# Patient Record
Sex: MALE | Race: BLACK OR AFRICAN AMERICAN | NOT HISPANIC OR LATINO | Employment: FULL TIME | URBAN - METROPOLITAN AREA
[De-identification: names, ages, dates, MRNs, and addresses within clinical notes are randomized per-mention and may not be internally consistent; named-entity substitution may affect disease eponyms.]

---

## 2021-05-24 ENCOUNTER — OFFICE VISIT (OUTPATIENT)
Dept: INTERNAL MEDICINE | Facility: CLINIC | Age: 51
End: 2021-05-24

## 2021-05-24 VITALS
DIASTOLIC BLOOD PRESSURE: 96 MMHG | WEIGHT: 155 LBS | HEIGHT: 64 IN | OXYGEN SATURATION: 98 % | RESPIRATION RATE: 18 BRPM | SYSTOLIC BLOOD PRESSURE: 132 MMHG | BODY MASS INDEX: 26.46 KG/M2 | TEMPERATURE: 97 F | HEART RATE: 74 BPM

## 2021-05-24 DIAGNOSIS — R94.31 ABNORMAL EKG: ICD-10-CM

## 2021-05-24 DIAGNOSIS — I10 ESSENTIAL HYPERTENSION: ICD-10-CM

## 2021-05-24 DIAGNOSIS — J30.1 NON-SEASONAL ALLERGIC RHINITIS DUE TO POLLEN: ICD-10-CM

## 2021-05-24 DIAGNOSIS — R07.89 ATYPICAL CHEST PAIN: Primary | ICD-10-CM

## 2021-05-24 LAB
ALBUMIN SERPL-MCNC: 4 G/DL (ref 3.5–5.2)
ALBUMIN/GLOB SERPL: 1.3 G/DL
ALP SERPL-CCNC: 85 U/L (ref 39–117)
ALT SERPL W P-5'-P-CCNC: 22 U/L (ref 1–41)
ANION GAP SERPL CALCULATED.3IONS-SCNC: 8 MMOL/L (ref 5–15)
AST SERPL-CCNC: 20 U/L (ref 1–40)
BASOPHILS # BLD AUTO: 0.01 10*3/MM3 (ref 0–0.2)
BASOPHILS NFR BLD AUTO: 0.3 % (ref 0–1.5)
BILIRUB SERPL-MCNC: 0.4 MG/DL (ref 0–1.2)
BUN SERPL-MCNC: 9 MG/DL (ref 6–20)
BUN/CREAT SERPL: 8.4 (ref 7–25)
CALCIUM SPEC-SCNC: 9.1 MG/DL (ref 8.6–10.5)
CHLORIDE SERPL-SCNC: 101 MMOL/L (ref 98–107)
CO2 SERPL-SCNC: 29 MMOL/L (ref 22–29)
CREAT SERPL-MCNC: 1.07 MG/DL (ref 0.76–1.27)
DEPRECATED RDW RBC AUTO: 40.8 FL (ref 37–54)
EOSINOPHIL # BLD AUTO: 0.04 10*3/MM3 (ref 0–0.4)
EOSINOPHIL NFR BLD AUTO: 1.2 % (ref 0.3–6.2)
ERYTHROCYTE [DISTWIDTH] IN BLOOD BY AUTOMATED COUNT: 12.2 % (ref 12.3–15.4)
GFR SERPL CREATININE-BSD FRML MDRD: 74 ML/MIN/1.73
GFR SERPL CREATININE-BSD FRML MDRD: 89 ML/MIN/1.73
GLOBULIN UR ELPH-MCNC: 3.2 GM/DL
GLUCOSE SERPL-MCNC: 78 MG/DL (ref 65–99)
HCT VFR BLD AUTO: 45.2 % (ref 37.5–51)
HGB BLD-MCNC: 15 G/DL (ref 13–17.7)
IMM GRANULOCYTES # BLD AUTO: 0.02 10*3/MM3 (ref 0–0.05)
IMM GRANULOCYTES NFR BLD AUTO: 0.6 % (ref 0–0.5)
LYMPHOCYTES # BLD AUTO: 1.93 10*3/MM3 (ref 0.7–3.1)
LYMPHOCYTES NFR BLD AUTO: 55.9 % (ref 19.6–45.3)
MCH RBC QN AUTO: 30.4 PG (ref 26.6–33)
MCHC RBC AUTO-ENTMCNC: 33.2 G/DL (ref 31.5–35.7)
MCV RBC AUTO: 91.5 FL (ref 79–97)
MONOCYTES # BLD AUTO: 0.39 10*3/MM3 (ref 0.1–0.9)
MONOCYTES NFR BLD AUTO: 11.3 % (ref 5–12)
NEUTROPHILS NFR BLD AUTO: 1.06 10*3/MM3 (ref 1.7–7)
NEUTROPHILS NFR BLD AUTO: 30.7 % (ref 42.7–76)
NRBC BLD AUTO-RTO: 0 /100 WBC (ref 0–0.2)
PLAT MORPH BLD: NORMAL
PLATELET # BLD AUTO: 187 10*3/MM3 (ref 140–450)
PMV BLD AUTO: 11.1 FL (ref 6–12)
POTASSIUM SERPL-SCNC: 3.7 MMOL/L (ref 3.5–5.2)
PROT SERPL-MCNC: 7.2 G/DL (ref 6–8.5)
RBC # BLD AUTO: 4.94 10*6/MM3 (ref 4.14–5.8)
RBC MORPH BLD: NORMAL
SODIUM SERPL-SCNC: 138 MMOL/L (ref 136–145)
TROPONIN T SERPL-MCNC: <0.01 NG/ML (ref 0–0.03)
WBC # BLD AUTO: 3.45 10*3/MM3 (ref 3.4–10.8)
WBC MORPH BLD: NORMAL

## 2021-05-24 PROCEDURE — 93000 ELECTROCARDIOGRAM COMPLETE: CPT | Performed by: NURSE PRACTITIONER

## 2021-05-24 PROCEDURE — 99204 OFFICE O/P NEW MOD 45 MIN: CPT | Performed by: NURSE PRACTITIONER

## 2021-05-24 PROCEDURE — 36415 COLL VENOUS BLD VENIPUNCTURE: CPT | Performed by: NURSE PRACTITIONER

## 2021-05-24 PROCEDURE — 85007 BL SMEAR W/DIFF WBC COUNT: CPT | Performed by: NURSE PRACTITIONER

## 2021-05-24 PROCEDURE — 85025 COMPLETE CBC W/AUTO DIFF WBC: CPT | Performed by: NURSE PRACTITIONER

## 2021-05-24 PROCEDURE — 80053 COMPREHEN METABOLIC PANEL: CPT | Performed by: NURSE PRACTITIONER

## 2021-05-24 PROCEDURE — 84484 ASSAY OF TROPONIN QUANT: CPT | Performed by: NURSE PRACTITIONER

## 2021-05-24 RX ORDER — CETIRIZINE HYDROCHLORIDE 10 MG/1
10 TABLET ORAL DAILY
Qty: 30 TABLET | Refills: 5 | Status: SHIPPED | OUTPATIENT
Start: 2021-05-24 | End: 2021-06-14

## 2021-05-24 RX ORDER — AMLODIPINE BESYLATE 10 MG/1
10 TABLET ORAL DAILY
Qty: 30 TABLET | Refills: 5 | Status: SHIPPED | OUTPATIENT
Start: 2021-05-24 | End: 2022-01-13

## 2021-05-24 RX ORDER — AMLODIPINE BESYLATE 5 MG/1
5 TABLET ORAL DAILY
COMMUNITY
Start: 2021-04-12 | End: 2021-05-24 | Stop reason: DRUGHIGH

## 2021-05-24 NOTE — PATIENT INSTRUCTIONS
Nonspecific Chest Pain, Adult  Chest pain can be caused by many different conditions. It can be caused by a condition that is life-threatening and requires treatment right away. It can also be caused by something that is not life-threatening. If you have chest pain, it can be hard to know the difference, so it is important to get help right away to make sure that you do not have a serious condition.  Some life-threatening causes of chest pain include:  · Heart attack.  · A tear in the body's main blood vessel (aortic dissection).  · Inflammation around your heart (pericarditis).  · A problem in the lungs, such as a blood clot (pulmonary embolism) or a collapsed lung (pneumothorax).  Some non life-threatening causes of chest pain include:  · Heartburn.  · Anxiety or stress.  · Damage to the bones, muscles, and cartilage that make up your chest wall.  · Pneumonia or bronchitis.  · Shingles infection (varicella-zoster virus).  Chest pain can feel like:  · Pain or discomfort on the surface of your chest or deep in your chest.  · Crushing, pressure, aching, or squeezing pain.  · Burning or tingling.  · Dull or sharp pain that is worse when you move, cough, or take a deep breath.  · Pain or discomfort that is also felt in your back, neck, jaw, shoulder, or arm, or pain that spreads to any of these areas.  Your chest pain may come and go. It may also be constant. Your health care provider will do lab tests and other studies to find the cause of your pain. Treatment will depend on the cause of your chest pain.  Follow these instructions at home:  Medicines  · Take over-the-counter and prescription medicines only as told by your health care provider.  · If you were prescribed an antibiotic, take it as told by your health care provider. Do not stop taking the antibiotic even if you start to feel better.  Lifestyle    · Rest as directed by your health care provider.  · Do not use any products that contain nicotine or tobacco,  such as cigarettes and e-cigarettes. If you need help quitting, ask your health care provider.  · Do not drink alcohol.  · Make healthy lifestyle choices as recommended. These may include:  ? Getting regular exercise. Ask your health care provider to suggest some activities that are safe for you.  ? Eating a heart-healthy diet. This includes plenty of fresh fruits and vegetables, whole grains, low-fat (lean) protein, and low-fat dairy products. A dietitian can help you find healthy eating options.  ? Maintaining a healthy weight.  ? Managing any other health conditions you have, such as high blood pressure (hypertension) or diabetes.  ? Reducing stress, such as with yoga or relaxation techniques.  General instructions  · Pay attention to any changes in your symptoms. Tell your health care provider about them or any new symptoms.  · Avoid any activities that cause chest pain.  · Keep all follow-up visits as told by your health care provider. This is important. This includes visits for any further testing if your chest pain does not go away.  Contact a health care provider if:  · Your chest pain does not go away.  · You feel depressed.  · You have a fever.  Get help right away if:  · Your chest pain gets worse.  · You have a cough that gets worse, or you cough up blood.  · You have severe pain in your abdomen.  · You faint.  · You have sudden, unexplained chest discomfort.  · You have sudden, unexplained discomfort in your arms, back, neck, or jaw.  · You have shortness of breath at any time.  · You suddenly start to sweat, or your skin gets clammy.  · You feel nausea or you vomit.  · You suddenly feel lightheaded or dizzy.  · You have severe weakness, or unexplained weakness or fatigue.  · Your heart begins to beat quickly, or it feels like it is skipping beats.  These symptoms may represent a serious problem that is an emergency. Do not wait to see if the symptoms will go away. Get medical help right away. Call your  local emergency services (911 in the [place] Do not drive yourself to the hospital.  Summary  · Chest pain can be caused by a condition that is serious and requires urgent treatment. It may also be caused by something that is not life-threatening.  · If you have chest pain, it is very important to see your health care provider. Your health care provider may do lab tests and other studies to find the cause of your pain.  · Follow your health care provider's instructions on taking medicines, making lifestyle changes, and getting emergency treatment if symptoms become worse.  · Keep all follow-up visits as told by your health care provider. This includes visits for any further testing if your chest pain does not go away.  This information is not intended to replace advice given to you by your health care provider. Make sure you discuss any questions you have with your health care provider.  Document Revised: 06/20/2019 Document Reviewed: 06/20/2019  M[employer] Interactive Patient Education © 2021 [employer]

## 2021-05-24 NOTE — PROGRESS NOTES
"Subjective   Chief Complaint   Patient presents with   • Headache     x1 week, more in the morning    • Nasal Congestion     allergies x1 year    • Dizziness   • Chest Pain     dull pain x1 week       Gerardo Mason is a 50 y.o. male here today as a new patient to establish care.  Pt recently moved from [place].  Pt complains of intermittent chest pain.  This started about a week ago.  The pain generally occurs at night.  He gets SOB with it but no nausea or diaphoresis.  The CP usually lasts a few seconds.  He has never had CP in the past.  He has a heart murmur and had an Echo about 3 years ago which was normal.  Pt does not smoke or drink any alcohol.  He denies any CP at this time.  When the pain occurs he has bilateral neck tightness.  He denies arm or back pain.  He denies any stress or feelings of anxiety.  Pt is a .  His last episode of CP was last night, \"had one episode\".  Pt states that he was having severe headaches in the morning but he hasn't had any since Saturday.    Pt complains of bloody mucous for about a year from his nose.  His previous PCP thought this was allergies so he was started on Claritin.  His allergies has worsened since he moved to [place].  His ears do not hurt but they do itch.  He denies sore throat.     Review of Systems   Constitutional: Negative for activity change, appetite change and fatigue.   HENT: Positive for congestion, postnasal drip and rhinorrhea. Negative for ear discharge, ear pain and sinus pressure.    Respiratory: Negative for cough, shortness of breath and wheezing.    Cardiovascular: Negative for chest pain and leg swelling.   Gastrointestinal: Negative for abdominal pain.   Neurological: Positive for dizziness and headache. Negative for weakness and confusion.   Psychiatric/Behavioral: Negative for behavioral problems and decreased concentration.       Past Medical History:   Diagnosis Date   • Colon polyp    • GERD (gastroesophageal reflux disease)    • Heart " "murmur    • Peptic ulceration      Past Surgical History:   Procedure Laterality Date   • PTERYGIUM EXCISION       Family History   Problem Relation Age of Onset   • Alzheimer's disease Mother      Social History     Tobacco Use   Smoking Status Never Smoker   Smokeless Tobacco Never Used      Social History     Substance and Sexual Activity   Alcohol Use Never      Current Outpatient Medications on File Prior to Visit   Medication Sig   • Oxymetazoline HCl (NASAL SPRAY 12 HOUR NA) into the nostril(s) as directed by provider.   • [DISCONTINUED] amLODIPine (NORVASC) 5 MG tablet Take 5 mg by mouth Daily.     No current facility-administered medications on file prior to visit.     Allergies   Allergen Reactions   • Penicillins Unknown - Low Severity       Objective   Vitals:    05/24/21 1114   BP: 132/96   Pulse: 74   Resp: 18   Temp: 97 °F (36.1 °C)   TempSrc: Temporal   SpO2: 98%   Weight: 70.3 kg (155 lb)   Height: 162.6 cm (64\")     Body mass index is 26.61 kg/m².    Physical Exam  Vitals and nursing note reviewed.   HENT:      Head: Normocephalic.      Comments: L TM dull with fluid behind it     Right Ear: Tympanic membrane, ear canal and external ear normal.      Left Ear: Ear canal normal.   Eyes:      Pupils: Pupils are equal, round, and reactive to light.   Cardiovascular:      Rate and Rhythm: Normal rate and regular rhythm.      Pulses: Normal pulses.      Heart sounds: Normal heart sounds.   Pulmonary:      Effort: Pulmonary effort is normal.      Breath sounds: Normal breath sounds.   Musculoskeletal:      Cervical back: Normal range of motion.   Skin:     General: Skin is warm and dry.      Capillary Refill: Capillary refill takes less than 2 seconds.   Neurological:      General: No focal deficit present.      Mental Status: He is alert and oriented to person, place, and time.   Psychiatric:         Mood and Affect: Mood normal.         Behavior: Behavior normal.         Thought Content: Thought content " normal.         ECG 12 Lead    Date/Time: 5/24/2021 12:18 PM  Performed by: Phil Tracey APRN  Authorized by: Phil Tracey APRN   Comparison: not compared with previous ECG   Rhythm: sinus rhythm  Rate: normal  Conduction: conduction normal  QRS axis: normal  Other findings: T wave abnormality    Clinical impression: abnormal EKG  Comments: Possible lateral ischema          Assessment/Plan   Problem List Items Addressed This Visit     None      Visit Diagnoses     Atypical chest pain    -  Primary    Relevant Orders    Comprehensive Metabolic Panel    CBC Auto Differential    Ambulatory Referral to Cardiology    Troponin    Non-seasonal allergic rhinitis due to pollen        Relevant Medications    cetirizine (zyrTEC) 10 MG tablet    Abnormal EKG        Relevant Orders    Troponin    Essential hypertension        Relevant Medications    amLODIPine (NORVASC) 10 MG tablet         Chest pain with neck tightness  EKG done - T-wave abnormality, possible lateral ischemia  BP elevated - pt states BP always high  Increase Amlodipine to 10mg  No Flonase due to nosebleeds  Recommend OTC Coricidin HBP for allergy symptoms  Labs today  Refer to cardio    Current Outpatient Medications:   •  Oxymetazoline HCl (NASAL SPRAY 12 HOUR NA), into the nostril(s) as directed by provider., Disp: , Rfl:   •  amLODIPine (NORVASC) 10 MG tablet, Take 1 tablet by mouth Daily., Disp: 30 tablet, Rfl: 5  •  cetirizine (zyrTEC) 10 MG tablet, Take 1 tablet by mouth Daily., Disp: 30 tablet, Rfl: 5       Plan of care reviewed with the patient at the conclusion of today's visit.  Education was provided regarding diagnosis, management, and any prescribed or recommended OTC medications.  Patient verbalized understanding of and agreement with management plan.     Return in 3 weeks (on 6/14/2021) for Recheck allergies & chest pain.      GOYO Haas

## 2021-06-14 ENCOUNTER — OFFICE VISIT (OUTPATIENT)
Dept: INTERNAL MEDICINE | Facility: CLINIC | Age: 51
End: 2021-06-14

## 2021-06-14 VITALS
DIASTOLIC BLOOD PRESSURE: 82 MMHG | HEIGHT: 64 IN | HEART RATE: 79 BPM | SYSTOLIC BLOOD PRESSURE: 120 MMHG | RESPIRATION RATE: 18 BRPM | BODY MASS INDEX: 26.46 KG/M2 | WEIGHT: 155 LBS | TEMPERATURE: 97.2 F | OXYGEN SATURATION: 98 %

## 2021-06-14 DIAGNOSIS — I10 ESSENTIAL HYPERTENSION: Primary | ICD-10-CM

## 2021-06-14 DIAGNOSIS — J30.1 SEASONAL ALLERGIC RHINITIS DUE TO POLLEN: ICD-10-CM

## 2021-06-14 PROCEDURE — 99214 OFFICE O/P EST MOD 30 MIN: CPT | Performed by: NURSE PRACTITIONER

## 2021-06-14 RX ORDER — FLUTICASONE PROPIONATE 50 MCG
2 SPRAY, SUSPENSION (ML) NASAL DAILY
Qty: 16 G | Refills: 5 | Status: SHIPPED | OUTPATIENT
Start: 2021-06-14 | End: 2022-04-04

## 2021-06-14 NOTE — PROGRESS NOTES
"Chief Complaint   Patient presents with   • Chest Pain     f/u       HPI  Gerardo Mason is a 50 y.o. male presents for follow-up on chest pain, HTN, and allergies.  Pt has an appt with cardiology later this month.  He has not had any recent episodes of chest pain.  His Amlodipine was increased at his last appt which has helped.  He states that he got Coricidin HBP which did seem to help his allergies.  He states the box stated not to take longer than 24 hours so he stopped it.  The medication did help him.  His nose feels congested or \"blocked\" every day.      The following portions of the patient's history were reviewed and updated as appropriate: allergies, current medications, past family history, past medical history, past social history, past surgical history and problem list.    Subjective  Review of Systems   Constitutional: Negative for activity change, appetite change and fatigue.   HENT: Positive for congestion and sneezing.    Respiratory: Negative for cough and shortness of breath.    Cardiovascular: Negative for chest pain and leg swelling.   Gastrointestinal: Negative for abdominal pain.   Neurological: Negative for dizziness, weakness and confusion.   Psychiatric/Behavioral: Negative for behavioral problems and decreased concentration.       Objective  Visit Vitals  /82   Pulse 79   Temp 97.2 °F (36.2 °C) (Temporal)   Resp 18   Ht 162.6 cm (64\")   Wt 70.3 kg (155 lb)   SpO2 98%   BMI 26.61 kg/m²        Physical Exam  Vitals and nursing note reviewed.   HENT:      Head: Normocephalic.      Nose: Mucosal edema and congestion present.   Eyes:      Pupils: Pupils are equal, round, and reactive to light.   Cardiovascular:      Rate and Rhythm: Normal rate and regular rhythm.      Pulses: Normal pulses.      Heart sounds: Normal heart sounds.   Pulmonary:      Effort: Pulmonary effort is normal.      Breath sounds: Normal breath sounds.   Skin:     General: Skin is warm and dry.      Capillary " Refill: Capillary refill takes less than 2 seconds.   Neurological:      General: No focal deficit present.      Mental Status: He is alert and oriented to person, place, and time.   Psychiatric:         Mood and Affect: Mood normal.         Behavior: Behavior normal.         Thought Content: Thought content normal.          Procedures     Office Visit on 05/24/2021   Component Date Value Ref Range Status   • Glucose 05/24/2021 78  65 - 99 mg/dL Final   • BUN 05/24/2021 9  6 - 20 mg/dL Final   • Creatinine 05/24/2021 1.07  0.76 - 1.27 mg/dL Final   • Sodium 05/24/2021 138  136 - 145 mmol/L Final   • Potassium 05/24/2021 3.7  3.5 - 5.2 mmol/L Final   • Chloride 05/24/2021 101  98 - 107 mmol/L Final   • CO2 05/24/2021 29.0  22.0 - 29.0 mmol/L Final   • Calcium 05/24/2021 9.1  8.6 - 10.5 mg/dL Final   • Total Protein 05/24/2021 7.2  6.0 - 8.5 g/dL Final   • Albumin 05/24/2021 4.00  3.50 - 5.20 g/dL Final   • ALT (SGPT) 05/24/2021 22  1 - 41 U/L Final   • AST (SGOT) 05/24/2021 20  1 - 40 U/L Final   • Alkaline Phosphatase 05/24/2021 85  39 - 117 U/L Final   • Total Bilirubin 05/24/2021 0.4  0.0 - 1.2 mg/dL Final   • eGFR Non  Amer 05/24/2021 74  >60 mL/min/1.73 Final   • eGFR   Amer 05/24/2021 89  >60 mL/min/1.73 Final   • Globulin 05/24/2021 3.2  gm/dL Final   • A/G Ratio 05/24/2021 1.3  g/dL Final   • BUN/Creatinine Ratio 05/24/2021 8.4  7.0 - 25.0 Final   • Anion Gap 05/24/2021 8.0  5.0 - 15.0 mmol/L Final   • Troponin T 05/24/2021 <0.010  0.000 - 0.030 ng/mL Final   • WBC 05/24/2021 3.45  3.40 - 10.80 10*3/mm3 Final   • RBC 05/24/2021 4.94  4.14 - 5.80 10*6/mm3 Final   • Hemoglobin 05/24/2021 15.0  13.0 - 17.7 g/dL Final   • Hematocrit 05/24/2021 45.2  37.5 - 51.0 % Final   • MCV 05/24/2021 91.5  79.0 - 97.0 fL Final   • MCH 05/24/2021 30.4  26.6 - 33.0 pg Final   • MCHC 05/24/2021 33.2  31.5 - 35.7 g/dL Final   • RDW 05/24/2021 12.2* 12.3 - 15.4 % Final   • RDW-SD 05/24/2021 40.8  37.0 - 54.0 fl Final    • MPV 05/24/2021 11.1  6.0 - 12.0 fL Final   • Platelets 05/24/2021 187  140 - 450 10*3/mm3 Final   • Neutrophil % 05/24/2021 30.7* 42.7 - 76.0 % Final   • Lymphocyte % 05/24/2021 55.9* 19.6 - 45.3 % Final   • Monocyte % 05/24/2021 11.3  5.0 - 12.0 % Final   • Eosinophil % 05/24/2021 1.2  0.3 - 6.2 % Final   • Basophil % 05/24/2021 0.3  0.0 - 1.5 % Final   • Immature Grans % 05/24/2021 0.6* 0.0 - 0.5 % Final   • Neutrophils, Absolute 05/24/2021 1.06* 1.70 - 7.00 10*3/mm3 Final   • Lymphocytes, Absolute 05/24/2021 1.93  0.70 - 3.10 10*3/mm3 Final   • Monocytes, Absolute 05/24/2021 0.39  0.10 - 0.90 10*3/mm3 Final   • Eosinophils, Absolute 05/24/2021 0.04  0.00 - 0.40 10*3/mm3 Final   • Basophils, Absolute 05/24/2021 0.01  0.00 - 0.20 10*3/mm3 Final   • Immature Grans, Absolute 05/24/2021 0.02  0.00 - 0.05 10*3/mm3 Final   • nRBC 05/24/2021 0.0  0.0 - 0.2 /100 WBC Final   • RBC Morphology 05/24/2021 Normal  Normal Final   • WBC Morphology 05/24/2021 Normal  Normal Final   • Platelet Morphology 05/24/2021 Normal  Normal Final        Assessment and Plan  Diagnoses and all orders for this visit:    1. Essential hypertension (Primary)    2. Seasonal allergic rhinitis due to pollen  -     fluticasone (Flonase) 50 MCG/ACT nasal spray; 2 sprays into the nostril(s) as directed by provider Daily.  Dispense: 16 g; Refill: 5    BP improved - cont 10mg Norvasc  Cont Coricidin for allergies, add Flonase to use daily  Labs discussed with pt  Keep cardiology appt    Return in about 2 months (around 8/14/2021) for Annual.      Phil Tracey, APRN

## 2021-06-29 ENCOUNTER — OFFICE VISIT (OUTPATIENT)
Dept: CARDIOLOGY | Facility: CLINIC | Age: 51
End: 2021-06-29

## 2021-06-29 ENCOUNTER — TELEPHONE (OUTPATIENT)
Dept: CARDIOLOGY | Facility: CLINIC | Age: 51
End: 2021-06-29

## 2021-06-29 VITALS
HEART RATE: 77 BPM | BODY MASS INDEX: 26.46 KG/M2 | HEIGHT: 64 IN | WEIGHT: 155 LBS | SYSTOLIC BLOOD PRESSURE: 110 MMHG | OXYGEN SATURATION: 99 % | DIASTOLIC BLOOD PRESSURE: 60 MMHG

## 2021-06-29 DIAGNOSIS — R07.89 CHEST PAIN, ATYPICAL: Primary | ICD-10-CM

## 2021-06-29 PROCEDURE — 93000 ELECTROCARDIOGRAM COMPLETE: CPT | Performed by: PHYSICIAN ASSISTANT

## 2021-06-29 PROCEDURE — 99243 OFF/OP CNSLTJ NEW/EST LOW 30: CPT | Performed by: PHYSICIAN ASSISTANT

## 2021-06-29 NOTE — TELEPHONE ENCOUNTER
----- Message from RAIMUNDO Rm sent at 6/29/2021  1:38 PM EDT -----  Patient had stress test and Echo with [facility] if we could get results. Thank you.

## 2021-06-29 NOTE — TELEPHONE ENCOUNTER
S/w Whitney at [place], [place]. She requested a formal fax request to be sent to [phone]. Faxed request. Her call back number is [phone].

## 2021-06-29 NOTE — PROGRESS NOTES
[facility] at [facility]  INITIAL OFFICE CONSULT      Gerardo Mason  1970  PCP: hPil Tracey APRN    SUBJECTIVE:   Gerardo Mason is a 51 y.o. male seen for a consultation visit regarding the following:     Chief Complaint:   Chief Complaint   Patient presents with   • Chest Pain   • Shortness of Breath   • Nausea          Consultation is requested by GOYO Haas for evaluation of Chest Pain, Shortness of Breath, and Nausea        History:  Pleasant 51-year-old gentleman originally from [place] presents today for evaluation regarding chest pain syndrome.  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  He was been in originally and alcohol more than 2 [place] and now he had moved to [place].  While in [place] he was having symptoms of chest pain described as a tightness or heaviness sometimes sharp in nature.  He did undergo work-up at that time in [place] with [facility] which included an echocardiogram and stress test.  He was told that everything was normal.  Since then [place] has [place] been here for 1 year.  He has not had any further chest pain he states his is not a concern for him at this time but had been in the past.  He states he is exercising walking couple miles a day with his kids.  He is able to preach for 45-minute time period with no symptoms.  [place] did have initially trouble with headaches which is felt to be possibly due to allergies, sinusitis as well as hypertension.  He was placed on amlodipine which is worked well his blood pressure well controlled.  His headaches have improved.  He is using his sprays which has helped his allergies.  He denies palpitation dizziness near syncope or ssyncope episodes.  Overall he feels he is currently at this time is doing well.      Cardiac PMH: (Old records have been reviewed and summarized below)  1. Atypical chest  pain  a. [facility].   b. Abnormal EKG:LVH strain pattern  2. HTN  3. GERD, PUD    Past Medical History, Past Surgical History, Family history, Social History, and Medications were all reviewed with the patient today and updated as necessary.     Current Outpatient Medications   Medication Sig Dispense Refill   • amLODIPine (NORVASC) 10 MG tablet Take 1 tablet by mouth Daily. 30 tablet 5   • fluticasone (Flonase) 50 MCG/ACT nasal spray 2 sprays into the nostril(s) as directed by provider Daily. 16 g 5     No current facility-administered medications for this visit.     Allergies   Allergen Reactions   • Penicillins Unknown - Low Severity         Past Medical History:   Diagnosis Date   • Colon polyp    • GERD (gastroesophageal reflux disease)    • Heart murmur    • Peptic ulceration      Past Surgical History:   Procedure Laterality Date   • PTERYGIUM EXCISION       Family History   Problem Relation Age of Onset   • Alzheimer's disease Mother      Social History     Tobacco Use   • Smoking status: Never Smoker   • Smokeless tobacco: Never Used   Substance Use Topics   • Alcohol use: Never       ROS:  Review of Symptoms:  General: no recent weight loss/gain, weakness or fatigue  Skin: no rashes, lumps, or other skin changes  HEENT: no dizziness, lightheadedness, or vision changes  Respiratory: no cough or hemoptysis  Cardiovascular: no palpitations, and tachycardia  Gastrointestinal: no black/tarry stools or diarrhea  Urinary: no change in frequency or urgency  Peripheral Vascular: no claudication or leg cramps  Musculoskeletal: no muscle or joint pain/stiffness  Psychiatric: no depression or excessive stress  Neurological: no sensory or motor loss, no syncope  Hematologic: no anemia, easy bruising or bleeding  Endocrine: no thyroid problems, nor heat or cold intolerance         PHYSICAL EXAM:   /60 (BP Location: Right arm, Patient Position:  "Sitting)   Pulse 77   Ht 162.6 cm (64\")   Wt 70.3 kg (155 lb)   SpO2 99%   BMI 26.61 kg/m²      Wt Readings from Last 5 Encounters:   06/29/21 70.3 kg (155 lb)   06/14/21 70.3 kg (155 lb)   05/24/21 70.3 kg (155 lb)     BP Readings from Last 5 Encounters:   06/29/21 110/60   06/14/21 120/82   05/24/21 132/96       General-Well Nourished, Well developed  Eyes - PERRLA  Neck- supple, No mass  CV- regular rate and rhythm, no MRG  Lung- clear bilaterally  Abd- soft, +BS  Musc/skel - Norm strength and range of motion  Skin- warm and dry  Neuro - Alert & Oriented x 3, appropriate mood.    Patient's external notes were reviewed.  Independent interpretation of test performed by another physician in facility were reviewed.  Outside laboratory data was also reviewed.    Medical problems and test results were reviewed with the patient today.     Results for orders placed or performed in visit on 05/24/21   Comprehensive Metabolic Panel    Specimen: Blood   Result Value Ref Range    Glucose 78 65 - 99 mg/dL    BUN 9 6 - 20 mg/dL    Creatinine 1.07 0.76 - 1.27 mg/dL    Sodium 138 136 - 145 mmol/L    Potassium 3.7 3.5 - 5.2 mmol/L    Chloride 101 98 - 107 mmol/L    CO2 29.0 22.0 - 29.0 mmol/L    Calcium 9.1 8.6 - 10.5 mg/dL    Total Protein 7.2 6.0 - 8.5 g/dL    Albumin 4.00 3.50 - 5.20 g/dL    ALT (SGPT) 22 1 - 41 U/L    AST (SGOT) 20 1 - 40 U/L    Alkaline Phosphatase 85 39 - 117 U/L    Total Bilirubin 0.4 0.0 - 1.2 mg/dL    eGFR Non African Amer 74 >60 mL/min/1.73    eGFR  African Amer 89 >60 mL/min/1.73    Globulin 3.2 gm/dL    A/G Ratio 1.3 g/dL    BUN/Creatinine Ratio 8.4 7.0 - 25.0    Anion Gap 8.0 5.0 - 15.0 mmol/L   Troponin    Specimen: Blood   Result Value Ref Range    Troponin T <0.010 0.000 - 0.030 ng/mL   CBC Auto Differential    Specimen: Blood   Result Value Ref Range    WBC 3.45 3.40 - 10.80 10*3/mm3    RBC 4.94 4.14 - 5.80 10*6/mm3    Hemoglobin 15.0 13.0 - 17.7 g/dL    Hematocrit 45.2 37.5 - 51.0 %    MCV " 91.5 79.0 - 97.0 fL    MCH 30.4 26.6 - 33.0 pg    MCHC 33.2 31.5 - 35.7 g/dL    RDW 12.2 (L) 12.3 - 15.4 %    RDW-SD 40.8 37.0 - 54.0 fl    MPV 11.1 6.0 - 12.0 fL    Platelets 187 140 - 450 10*3/mm3    Neutrophil % 30.7 (L) 42.7 - 76.0 %    Lymphocyte % 55.9 (H) 19.6 - 45.3 %    Monocyte % 11.3 5.0 - 12.0 %    Eosinophil % 1.2 0.3 - 6.2 %    Basophil % 0.3 0.0 - 1.5 %    Immature Grans % 0.6 (H) 0.0 - 0.5 %    Neutrophils, Absolute 1.06 (L) 1.70 - 7.00 10*3/mm3    Lymphocytes, Absolute 1.93 0.70 - 3.10 10*3/mm3    Monocytes, Absolute 0.39 0.10 - 0.90 10*3/mm3    Eosinophils, Absolute 0.04 0.00 - 0.40 10*3/mm3    Basophils, Absolute 0.01 0.00 - 0.20 10*3/mm3    Immature Grans, Absolute 0.02 0.00 - 0.05 10*3/mm3    nRBC 0.0 0.0 - 0.2 /100 WBC   Scan Slide    Specimen: Blood   Result Value Ref Range    RBC Morphology Normal Normal    WBC Morphology Normal Normal    Platelet Morphology Normal Normal         No results found for: CHOL, HDL, HDLC, LDL, LDLC, VLDL    EKG:  (EKG/Tracing has been independently visualized by me and summarized below)      ECG 12 Lead    Date/Time: 6/29/2021 1:07 PM  Performed by: Mike Rasmussen PA  Authorized by: Mike Rasmussen PA   Rhythm: sinus rhythm  Rate: normal  Conduction: conduction normal  ST Segments: ST segments normal  T Waves: T waves normal  QRS axis: normal  Other findings: non-specific ST-T wave changes    Clinical impression: abnormal EKG          ASSESSMENT   1. Atypical chest pain-Non cardiac chest pain.  2. Negative cardiac work up-[facility]  3. GERD  4. Abnormal EKG-LVH strain pattern    PLAN  · No recurrent chest pain with previous negative cardiac evaluation.   · BP well controlled on current medications.  · Obtain records from [facility] regarding recent stress test and Echo.  · Will monitor if recurrent symtpoms he will call an let us know.    · Follow up 8 months or sooner as needed.     Cardiology/Electrophysiology  06/29/21  13:04  EDT  Will Grady LEE

## 2021-08-23 ENCOUNTER — OFFICE VISIT (OUTPATIENT)
Dept: INTERNAL MEDICINE | Facility: CLINIC | Age: 51
End: 2021-08-23

## 2021-08-23 VITALS
WEIGHT: 157 LBS | DIASTOLIC BLOOD PRESSURE: 82 MMHG | OXYGEN SATURATION: 98 % | HEIGHT: 64 IN | HEART RATE: 76 BPM | BODY MASS INDEX: 26.8 KG/M2 | SYSTOLIC BLOOD PRESSURE: 130 MMHG

## 2021-08-23 DIAGNOSIS — F98.8 ATTENTION DEFICIT DISORDER (ADD) IN ADULT: Primary | ICD-10-CM

## 2021-08-23 PROCEDURE — 99213 OFFICE O/P EST LOW 20 MIN: CPT | Performed by: NURSE PRACTITIONER

## 2021-08-23 RX ORDER — BUPROPION HYDROCHLORIDE 150 MG/1
TABLET ORAL
Qty: 60 TABLET | Refills: 1 | Status: SHIPPED | OUTPATIENT
Start: 2021-08-23 | End: 2021-09-20

## 2021-08-23 NOTE — PROGRESS NOTES
"Chief Complaint   Patient presents with   • ADHD       HPI  Gerardo Mason is a 51 y.o. male presents for follow-up.  Pt complains of having an inability to focus while in class.  He states he starts reading and then his mind wonders off and he has to take extra time to finish assignments.  Denies any depression or anxiety.   He is currently working on a PHD.  He is afraid that the inability to focus could affect his grades.  He states even just being in his home he has a hard time staying on task.  Requesting medication to help him focus.  His wife has to help type some of his assignments.    The following portions of the patient's history were reviewed and updated as appropriate: allergies, current medications, past family history, past medical history, past social history, past surgical history and problem list.    Subjective  Review of Systems   Constitutional: Negative for activity change, appetite change and fatigue.   HENT: Negative for congestion.    Respiratory: Negative for cough and shortness of breath.    Cardiovascular: Negative for chest pain and leg swelling.   Gastrointestinal: Negative for abdominal pain.   Neurological: Negative for dizziness, weakness and confusion.   Psychiatric/Behavioral: Negative for behavioral problems, decreased concentration, sleep disturbance, negative for hyperactivity and depressed mood. The patient is not nervous/anxious.        Objective  Visit Vitals  /82   Pulse 76   Ht 162.6 cm (64\")   Wt 71.2 kg (157 lb)   SpO2 98%   BMI 26.95 kg/m²        Physical Exam  Vitals and nursing note reviewed.   HENT:      Head: Normocephalic.   Eyes:      Pupils: Pupils are equal, round, and reactive to light.   Cardiovascular:      Rate and Rhythm: Normal rate and regular rhythm.      Pulses: Normal pulses.      Heart sounds: Normal heart sounds.   Pulmonary:      Effort: Pulmonary effort is normal.      Breath sounds: Normal breath sounds.   Skin:     General: Skin is warm and " dry.      Capillary Refill: Capillary refill takes less than 2 seconds.   Neurological:      General: No focal deficit present.      Mental Status: He is alert and oriented to person, place, and time.      Gait: Gait is intact.   Psychiatric:         Attention and Perception: Attention normal.         Mood and Affect: Mood normal.         Behavior: Behavior normal.         Thought Content: Thought content normal.         Judgment: Judgment normal.           Procedures     Assessment and Plan  Diagnoses and all orders for this visit:    1. Attention deficit disorder (ADD) in adult (Primary)  -     buPROPion XL (WELLBUTRIN XL) 150 MG 24 hr tablet; Take 1 tablet PO daily x1 week and then increase to BID  Dispense: 60 tablet; Refill: 1    try Wellbutrin  No hyperactivitiy  F/u 4 weeks    Return in about 4 weeks (around 9/20/2021) for Recheck ADD.         Phil Tracey, APRN

## 2021-09-20 ENCOUNTER — HOSPITAL ENCOUNTER (OUTPATIENT)
Dept: GENERAL RADIOLOGY | Facility: HOSPITAL | Age: 51
Discharge: HOME | End: 2021-09-20
Admitting: NURSE PRACTITIONER

## 2021-09-20 ENCOUNTER — OFFICE VISIT (OUTPATIENT)
Dept: INTERNAL MEDICINE | Facility: CLINIC | Age: 51
End: 2021-09-20

## 2021-09-20 VITALS
DIASTOLIC BLOOD PRESSURE: 98 MMHG | OXYGEN SATURATION: 98 % | BODY MASS INDEX: 27.04 KG/M2 | TEMPERATURE: 97.2 F | WEIGHT: 158.4 LBS | SYSTOLIC BLOOD PRESSURE: 134 MMHG | HEART RATE: 69 BPM | HEIGHT: 64 IN

## 2021-09-20 DIAGNOSIS — M54.50 ACUTE RIGHT-SIDED LOW BACK PAIN WITHOUT SCIATICA: ICD-10-CM

## 2021-09-20 DIAGNOSIS — I10 ESSENTIAL HYPERTENSION: ICD-10-CM

## 2021-09-20 DIAGNOSIS — H69.82 EUSTACHIAN TUBE DYSFUNCTION, LEFT: ICD-10-CM

## 2021-09-20 DIAGNOSIS — Z00.00 ROUTINE PHYSICAL EXAMINATION: Primary | ICD-10-CM

## 2021-09-20 PROCEDURE — 99396 PREV VISIT EST AGE 40-64: CPT | Performed by: NURSE PRACTITIONER

## 2021-09-20 PROCEDURE — 72100 X-RAY EXAM L-S SPINE 2/3 VWS: CPT

## 2021-09-20 RX ORDER — LORATADINE 10 MG/1
10 TABLET ORAL DAILY
Qty: 30 TABLET | Refills: 2 | Status: SHIPPED | OUTPATIENT
Start: 2021-09-20 | End: 2022-04-04

## 2021-09-20 NOTE — PROGRESS NOTES
Subjective   Chief Complaint   Patient presents with   • Annual Exam        Gerardo Mason is a 51 y.o. male here today for annual exam.  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 so he doesn't feel like he need sit for ADD at this time    Overall healthy: Yes  Regular exercise:  Yes, walks alot  Diet is well balanced:  Yes  Vitamin Supplement:  No  Alcohol intake:  No   Tobacco use:  No    Cardiovascular risk is low:  Yes   PSA: N/A  ED or bedroom issues: No  Concern for STDs:  No  Last colon screening:  Yes - had a colonoscopy 2 years ago - normal  Regular dental exam:  No   Regular eye exam:  Yes  Immunizations up to date:  Yes  Wear a seatbelt regularly:  Yes  Wear sunscreen regularly when outdoors:  No    Review of Systems   Constitutional: Negative for activity change, appetite change and fatigue.   HENT: Negative for congestion.    Respiratory: Negative for cough and shortness of breath.    Cardiovascular: Negative for chest pain and leg swelling.   Gastrointestinal: Negative for abdominal pain.   Genitourinary: Negative for erectile dysfunction.   Musculoskeletal: Positive for back pain (right side of back).   Skin: Negative for rash.   Neurological: Negative for dizziness, weakness and confusion.   Psychiatric/Behavioral: Negative for behavioral problems and decreased concentration.       The following portions of the patient's history were reviewed and updated as appropriate: allergies, current medications, past family history, past medical history, past social history, past surgical history and problem list.    Past Medical History:   Diagnosis Date   • Colon polyp    • GERD (gastroesophageal reflux disease)    • Heart murmur    • Peptic ulceration      Past Surgical History:   Procedure Laterality Date   • PTERYGIUM EXCISION       Family History   Problem Relation Age of Onset   • Alzheimer's disease Mother      Social History     Tobacco Use   Smoking Status Never Smoker  "  Smokeless Tobacco Never Used     Social History     Substance and Sexual Activity   Alcohol Use Never     Allergies   Allergen Reactions   • Penicillins Unknown - Low Severity       Current Outpatient Medications on File Prior to Visit   Medication Sig   • amLODIPine (NORVASC) 10 MG tablet Take 1 tablet by mouth Daily.   • fluticasone (Flonase) 50 MCG/ACT nasal spray 2 sprays into the nostril(s) as directed by provider Daily.   • [DISCONTINUED] buPROPion XL (WELLBUTRIN XL) 150 MG 24 hr tablet Take 1 tablet PO daily x1 week and then increase to BID     No current facility-administered medications on file prior to visit.       Objective   Vitals:    09/20/21 1020   BP: 134/98   Pulse: 69   Temp: 97.2 °F (36.2 °C)   TempSrc: Temporal   SpO2: 98%   Weight: 71.8 kg (158 lb 6.4 oz)   Height: 162.6 cm (64\")      Body mass index is 27.19 kg/m².    Physical Exam  Vitals and nursing note reviewed.   Constitutional:       Appearance: Normal appearance.   HENT:      Head: Normocephalic.      Right Ear: Tympanic membrane, ear canal and external ear normal.      Left Ear: Ear canal and external ear normal. A middle ear effusion is present.      Mouth/Throat:      Mouth: Mucous membranes are moist.      Pharynx: Oropharynx is clear.   Eyes:      Extraocular Movements: Extraocular movements intact.      Conjunctiva/sclera: Conjunctivae normal.      Pupils: Pupils are equal, round, and reactive to light.      Comments: Wearing glasses   Neck:      Thyroid: No thyroid mass, thyromegaly or thyroid tenderness.      Vascular: No carotid bruit.   Cardiovascular:      Rate and Rhythm: Normal rate and regular rhythm.      Pulses: Normal pulses.      Heart sounds: Normal heart sounds.   Pulmonary:      Effort: Pulmonary effort is normal.      Breath sounds: Normal breath sounds.   Abdominal:      General: Bowel sounds are normal. There is no distension.      Palpations: Abdomen is soft.      Tenderness: There is no abdominal tenderness. " There is no right CVA tenderness or left CVA tenderness.   Musculoskeletal:      Cervical back: Normal.      Thoracic back: Normal.      Lumbar back: Tenderness present. Normal range of motion. Positive right straight leg raise test. Negative left straight leg raise test.        Back:    Lymphadenopathy:      Cervical: No cervical adenopathy.   Skin:     General: Skin is warm and dry.      Capillary Refill: Capillary refill takes less than 2 seconds.      Findings: No lesion or rash.   Neurological:      General: No focal deficit present.      Mental Status: He is alert and oriented to person, place, and time.      Cranial Nerves: Cranial nerves are intact.      Sensory: Sensation is intact.      Motor: Motor function is intact.      Coordination: Coordination is intact.      Gait: Gait is intact.   Psychiatric:         Attention and Perception: Attention normal.         Mood and Affect: Mood normal.         Speech: Speech normal.         Behavior: Behavior normal.         Thought Content: Thought content normal.         Cognition and Memory: Cognition and memory normal.         Judgment: Judgment normal.         Patient's Body mass index is 27.19 kg/m². indicating that he is overweight (BMI 25-29.9). Obesity-related health conditions include the following: hypertension. Obesity is unchanged. BMI is is above average; BMI management plan is completed. We discussed portion control and increasing exercise..       Assessment/Plan     Current Outpatient Medications:   •  amLODIPine (NORVASC) 10 MG tablet, Take 1 tablet by mouth Daily., Disp: 30 tablet, Rfl: 5  •  fluticasone (Flonase) 50 MCG/ACT nasal spray, 2 sprays into the nostril(s) as directed by provider Daily., Disp: 16 g, Rfl: 5  •  loratadine (Claritin) 10 MG tablet, Take 1 tablet by mouth Daily., Disp: 30 tablet, Rfl: 2    Problem List Items Addressed This Visit     None      Visit Diagnoses     Routine physical examination    -  Primary    Relevant Orders     CBC & Differential    Comprehensive Metabolic Panel    TSH Rfx On Abnormal To Free T4    Acute right-sided low back pain without sciatica        Relevant Orders    XR Spine Lumbar 2 or 3 View    Eustachian tube dysfunction, left        Relevant Medications    loratadine (Claritin) 10 MG tablet    Essential hypertension        Relevant Orders    Lipid Panel    Hemoglobin A1c         Physical completed  Took BP med on the way to clinic  Return for fasting labs  Order for Lumbar XR         Counseling was given to patient for the following topics: appropriate exercise, healthy eating habits, disease prevention and importance of self testicular exam.     Plan of care reviewed with the patient at the conclusion of today's visit.  Education was provided regarding diagnosis, management, and any prescribed or recommended OTC medications.  Patient verbalized understanding of and agreement with management plan.     Return in about 6 months (around 3/20/2022) for HTN.      GOYO Haas

## 2021-09-28 ENCOUNTER — LAB (OUTPATIENT)
Dept: LAB | Facility: HOSPITAL | Age: 51
End: 2021-09-28

## 2021-09-28 DIAGNOSIS — I10 ESSENTIAL HYPERTENSION: ICD-10-CM

## 2021-09-28 DIAGNOSIS — Z00.00 ROUTINE PHYSICAL EXAMINATION: ICD-10-CM

## 2021-09-28 LAB
BASOPHILS # BLD AUTO: 0.01 10*3/MM3 (ref 0–0.2)
BASOPHILS NFR BLD AUTO: 0.3 % (ref 0–1.5)
DEPRECATED RDW RBC AUTO: 41.8 FL (ref 37–54)
EOSINOPHIL # BLD AUTO: 0.06 10*3/MM3 (ref 0–0.4)
EOSINOPHIL NFR BLD AUTO: 1.6 % (ref 0.3–6.2)
ERYTHROCYTE [DISTWIDTH] IN BLOOD BY AUTOMATED COUNT: 13.1 % (ref 12.3–15.4)
HCT VFR BLD AUTO: 44.3 % (ref 37.5–51)
HGB BLD-MCNC: 15 G/DL (ref 13–17.7)
LYMPHOCYTES # BLD AUTO: 2.22 10*3/MM3 (ref 0.7–3.1)
LYMPHOCYTES NFR BLD AUTO: 58.4 % (ref 19.6–45.3)
MCH RBC QN AUTO: 29.9 PG (ref 26.6–33)
MCHC RBC AUTO-ENTMCNC: 33.9 G/DL (ref 31.5–35.7)
MCV RBC AUTO: 88.2 FL (ref 79–97)
MONOCYTES # BLD AUTO: 0.27 10*3/MM3 (ref 0.1–0.9)
MONOCYTES NFR BLD AUTO: 7.1 % (ref 5–12)
NEUTROPHILS NFR BLD AUTO: 1.23 10*3/MM3 (ref 1.7–7)
NEUTROPHILS NFR BLD AUTO: 32.3 % (ref 42.7–76)
PLATELET # BLD AUTO: 167 10*3/MM3 (ref 140–450)
PMV BLD AUTO: 11.5 FL (ref 6–12)
RBC # BLD AUTO: 5.02 10*6/MM3 (ref 4.14–5.8)
WBC # BLD AUTO: 3.8 10*3/MM3 (ref 3.4–10.8)

## 2021-09-28 PROCEDURE — 80061 LIPID PANEL: CPT

## 2021-09-28 PROCEDURE — 84443 ASSAY THYROID STIM HORMONE: CPT

## 2021-09-28 PROCEDURE — 83036 HEMOGLOBIN GLYCOSYLATED A1C: CPT

## 2021-09-28 PROCEDURE — 85025 COMPLETE CBC W/AUTO DIFF WBC: CPT

## 2021-09-28 PROCEDURE — 80053 COMPREHEN METABOLIC PANEL: CPT

## 2021-09-29 ENCOUNTER — TELEPHONE (OUTPATIENT)
Dept: INTERNAL MEDICINE | Facility: CLINIC | Age: 51
End: 2021-09-29

## 2021-09-29 DIAGNOSIS — E78.2 MIXED HYPERLIPIDEMIA: Primary | ICD-10-CM

## 2021-09-29 LAB
ALBUMIN SERPL-MCNC: 4.6 G/DL (ref 3.5–5.2)
ALBUMIN/GLOB SERPL: 1.6 G/DL
ALP SERPL-CCNC: 86 U/L (ref 39–117)
ALT SERPL W P-5'-P-CCNC: 23 U/L (ref 1–41)
ANION GAP SERPL CALCULATED.3IONS-SCNC: 13.8 MMOL/L (ref 5–15)
AST SERPL-CCNC: 23 U/L (ref 1–40)
BILIRUB SERPL-MCNC: 0.4 MG/DL (ref 0–1.2)
BUN SERPL-MCNC: 17 MG/DL (ref 6–20)
BUN/CREAT SERPL: 16 (ref 7–25)
CALCIUM SPEC-SCNC: 9.1 MG/DL (ref 8.6–10.5)
CHLORIDE SERPL-SCNC: 105 MMOL/L (ref 98–107)
CHOLEST SERPL-MCNC: 221 MG/DL (ref 0–200)
CO2 SERPL-SCNC: 24.2 MMOL/L (ref 22–29)
CREAT SERPL-MCNC: 1.06 MG/DL (ref 0.76–1.27)
GFR SERPL CREATININE-BSD FRML MDRD: 74 ML/MIN/1.73
GLOBULIN UR ELPH-MCNC: 2.8 GM/DL
GLUCOSE SERPL-MCNC: 83 MG/DL (ref 65–99)
HBA1C MFR BLD: 5.44 % (ref 4.8–5.6)
HDLC SERPL-MCNC: 58 MG/DL (ref 40–60)
LDLC SERPL CALC-MCNC: 147 MG/DL (ref 0–100)
LDLC/HDLC SERPL: 2.51 {RATIO}
POTASSIUM SERPL-SCNC: 3.5 MMOL/L (ref 3.5–5.2)
PROT SERPL-MCNC: 7.4 G/DL (ref 6–8.5)
SODIUM SERPL-SCNC: 143 MMOL/L (ref 136–145)
TRIGL SERPL-MCNC: 88 MG/DL (ref 0–150)
TSH SERPL DL<=0.05 MIU/L-ACNC: 1.12 UIU/ML (ref 0.27–4.2)
VLDLC SERPL-MCNC: 16 MG/DL (ref 5–40)

## 2021-09-29 RX ORDER — ATORVASTATIN CALCIUM 10 MG/1
10 TABLET, FILM COATED ORAL NIGHTLY
Qty: 30 TABLET | Refills: 5 | Status: SHIPPED | OUTPATIENT
Start: 2021-09-29 | End: 2022-04-04

## 2021-09-29 NOTE — TELEPHONE ENCOUNTER
Spoke with pt, advised him to start a low fat diet, and become more active as well as taking the medication that was sent to the pharmacy

## 2021-11-18 ENCOUNTER — OFFICE VISIT (OUTPATIENT)
Dept: INTERNAL MEDICINE | Facility: CLINIC | Age: 51
End: 2021-11-18

## 2021-11-18 ENCOUNTER — LAB (OUTPATIENT)
Dept: LAB | Facility: HOSPITAL | Age: 51
End: 2021-11-18

## 2021-11-18 VITALS
HEIGHT: 64 IN | OXYGEN SATURATION: 99 % | SYSTOLIC BLOOD PRESSURE: 117 MMHG | BODY MASS INDEX: 27.76 KG/M2 | TEMPERATURE: 96.6 F | WEIGHT: 162.6 LBS | DIASTOLIC BLOOD PRESSURE: 79 MMHG | HEART RATE: 63 BPM

## 2021-11-18 DIAGNOSIS — E78.2 MIXED HYPERLIPIDEMIA: ICD-10-CM

## 2021-11-18 DIAGNOSIS — R10.13 EPIGASTRIC PAIN: ICD-10-CM

## 2021-11-18 DIAGNOSIS — M54.31 SCIATICA OF RIGHT SIDE: ICD-10-CM

## 2021-11-18 DIAGNOSIS — R10.13 EPIGASTRIC PAIN: Primary | ICD-10-CM

## 2021-11-18 LAB
ALBUMIN SERPL-MCNC: 4.4 G/DL (ref 3.5–5.2)
ALBUMIN/GLOB SERPL: 1.5 G/DL
ALP SERPL-CCNC: 86 U/L (ref 39–117)
ALT SERPL W P-5'-P-CCNC: 51 U/L (ref 1–41)
ANION GAP SERPL CALCULATED.3IONS-SCNC: 7.7 MMOL/L (ref 5–15)
AST SERPL-CCNC: 37 U/L (ref 1–40)
BASOPHILS # BLD AUTO: 0.02 10*3/MM3 (ref 0–0.2)
BASOPHILS NFR BLD AUTO: 0.5 % (ref 0–1.5)
BILIRUB SERPL-MCNC: 0.5 MG/DL (ref 0–1.2)
BUN SERPL-MCNC: 12 MG/DL (ref 6–20)
BUN/CREAT SERPL: 11.5 (ref 7–25)
CALCIUM SPEC-SCNC: 9.1 MG/DL (ref 8.6–10.5)
CHLORIDE SERPL-SCNC: 105 MMOL/L (ref 98–107)
CHOLEST SERPL-MCNC: 137 MG/DL (ref 0–200)
CO2 SERPL-SCNC: 28.3 MMOL/L (ref 22–29)
CREAT SERPL-MCNC: 1.04 MG/DL (ref 0.76–1.27)
DEPRECATED RDW RBC AUTO: 43.6 FL (ref 37–54)
EOSINOPHIL # BLD AUTO: 0.04 10*3/MM3 (ref 0–0.4)
EOSINOPHIL NFR BLD AUTO: 1.1 % (ref 0.3–6.2)
ERYTHROCYTE [DISTWIDTH] IN BLOOD BY AUTOMATED COUNT: 13.2 % (ref 12.3–15.4)
GFR SERPL CREATININE-BSD FRML MDRD: 76 ML/MIN/1.73
GLOBULIN UR ELPH-MCNC: 3 GM/DL
GLUCOSE SERPL-MCNC: 85 MG/DL (ref 65–99)
HCT VFR BLD AUTO: 42.1 % (ref 37.5–51)
HDLC SERPL-MCNC: 54 MG/DL (ref 40–60)
HGB BLD-MCNC: 14.4 G/DL (ref 13–17.7)
IMM GRANULOCYTES # BLD AUTO: 0.01 10*3/MM3 (ref 0–0.05)
IMM GRANULOCYTES NFR BLD AUTO: 0.3 % (ref 0–0.5)
LDLC SERPL CALC-MCNC: 67 MG/DL (ref 0–100)
LDLC/HDLC SERPL: 1.24 {RATIO}
LYMPHOCYTES # BLD AUTO: 1.94 10*3/MM3 (ref 0.7–3.1)
LYMPHOCYTES NFR BLD AUTO: 51.3 % (ref 19.6–45.3)
MCH RBC QN AUTO: 30.6 PG (ref 26.6–33)
MCHC RBC AUTO-ENTMCNC: 34.2 G/DL (ref 31.5–35.7)
MCV RBC AUTO: 89.4 FL (ref 79–97)
MONOCYTES # BLD AUTO: 0.46 10*3/MM3 (ref 0.1–0.9)
MONOCYTES NFR BLD AUTO: 12.2 % (ref 5–12)
NEUTROPHILS NFR BLD AUTO: 1.31 10*3/MM3 (ref 1.7–7)
NEUTROPHILS NFR BLD AUTO: 34.6 % (ref 42.7–76)
NRBC BLD AUTO-RTO: 0 /100 WBC (ref 0–0.2)
PLATELET # BLD AUTO: 189 10*3/MM3 (ref 140–450)
PMV BLD AUTO: 11.4 FL (ref 6–12)
POTASSIUM SERPL-SCNC: 3.6 MMOL/L (ref 3.5–5.2)
PROT SERPL-MCNC: 7.4 G/DL (ref 6–8.5)
RBC # BLD AUTO: 4.71 10*6/MM3 (ref 4.14–5.8)
SODIUM SERPL-SCNC: 141 MMOL/L (ref 136–145)
TRIGL SERPL-MCNC: 81 MG/DL (ref 0–150)
VLDLC SERPL-MCNC: 16 MG/DL (ref 5–40)
WBC NRBC COR # BLD: 3.78 10*3/MM3 (ref 3.4–10.8)

## 2021-11-18 PROCEDURE — 80061 LIPID PANEL: CPT | Performed by: NURSE PRACTITIONER

## 2021-11-18 PROCEDURE — 36415 COLL VENOUS BLD VENIPUNCTURE: CPT

## 2021-11-18 PROCEDURE — 85025 COMPLETE CBC W/AUTO DIFF WBC: CPT

## 2021-11-18 PROCEDURE — 99214 OFFICE O/P EST MOD 30 MIN: CPT | Performed by: NURSE PRACTITIONER

## 2021-11-18 PROCEDURE — 80053 COMPREHEN METABOLIC PANEL: CPT | Performed by: NURSE PRACTITIONER

## 2021-11-18 RX ORDER — PREDNISONE 10 MG/1
TABLET ORAL
Qty: 21 TABLET | Refills: 0 | Status: SHIPPED | OUTPATIENT
Start: 2021-11-18 | End: 2021-12-20

## 2021-11-18 NOTE — PROGRESS NOTES
"Chief Complaint   Patient presents with   • Abdominal Pain     strenum area       HPI  Gerardo Mason is a 51 y.o. male presents for follow-up.  Pt complains of epigastric pain.  He denies any N/V or diarrhea.  Pt reports that it hurts to lay on his stomach.  He states that he feels \"sore\" in the area.  He denies any other abdominal pain.  Denies CP.  He states this happened about 3 years ago and he went to the ER and was told there was something wrong with his pancreas.  He does not recall getting a CT scan at that time.  Still has low back pain on the right side.  The pain radiates to his right thigh.  He had a negative XR at his last appt.    The following portions of the patient's history were reviewed and updated as appropriate: allergies, current medications, past family history, past medical history, past social history, past surgical history and problem list.    Subjective  Review of Systems   Constitutional: Negative for activity change, appetite change and fatigue.   HENT: Negative for congestion.    Respiratory: Negative for cough and shortness of breath.    Cardiovascular: Negative for chest pain and leg swelling.   Gastrointestinal: Negative for abdominal pain (epigastric), constipation, diarrhea, nausea and vomiting.   Neurological: Negative for dizziness, weakness and confusion.   Psychiatric/Behavioral: Negative for behavioral problems and decreased concentration.       Objective  Visit Vitals  /79   Pulse 63   Temp 96.6 °F (35.9 °C)   Ht 162.6 cm (64\")   Wt 73.8 kg (162 lb 9.6 oz)   SpO2 99%   BMI 27.91 kg/m²        Physical Exam  Vitals and nursing note reviewed.   HENT:      Head: Normocephalic.   Eyes:      Pupils: Pupils are equal, round, and reactive to light.   Cardiovascular:      Rate and Rhythm: Normal rate and regular rhythm.      Pulses: Normal pulses.      Heart sounds: Normal heart sounds.   Pulmonary:      Effort: Pulmonary effort is normal.      Breath sounds: Normal breath " sounds.   Abdominal:      General: Bowel sounds are increased. There is no distension.      Palpations: Abdomen is soft.      Tenderness: There is abdominal tenderness (epigastric). There is no guarding or rebound.   Musculoskeletal:        Back:    Skin:     General: Skin is warm and dry.      Capillary Refill: Capillary refill takes less than 2 seconds.   Neurological:      General: No focal deficit present.      Mental Status: He is alert and oriented to person, place, and time.      Gait: Gait is intact.   Psychiatric:         Attention and Perception: Attention normal.         Mood and Affect: Mood normal.         Behavior: Behavior normal.          Procedures     Assessment and Plan   Diagnoses and all orders for this visit:    1. Epigastric pain (Primary)  -     CBC w AUTO Differential; Future  -     Comprehensive Metabolic Panel  -     CT abdomen w contrast; Future    2. Sciatica of right side  -     predniSONE (DELTASONE) 10 MG (21) dose pack; Use as directed on package  Dispense: 21 tablet; Refill: 0    3. Mixed hyperlipidemia  -     Lipid Panel    offered Omeprazole, pt declined  Check labs  Order CT scan per pt request  Recheck lipids, pt would like to consider stopping the cholesterol and BP meds in the near future      GOYO Haas

## 2021-12-20 ENCOUNTER — OFFICE VISIT (OUTPATIENT)
Dept: INTERNAL MEDICINE | Facility: CLINIC | Age: 51
End: 2021-12-20

## 2021-12-20 VITALS
SYSTOLIC BLOOD PRESSURE: 124 MMHG | TEMPERATURE: 97.1 F | WEIGHT: 159.4 LBS | HEIGHT: 64 IN | OXYGEN SATURATION: 99 % | BODY MASS INDEX: 27.21 KG/M2 | HEART RATE: 69 BPM | DIASTOLIC BLOOD PRESSURE: 82 MMHG

## 2021-12-20 DIAGNOSIS — R10.13 EPIGASTRIC PAIN: Primary | ICD-10-CM

## 2021-12-20 DIAGNOSIS — M54.31 SCIATICA, RIGHT SIDE: ICD-10-CM

## 2021-12-20 PROCEDURE — 99213 OFFICE O/P EST LOW 20 MIN: CPT | Performed by: NURSE PRACTITIONER

## 2021-12-20 RX ORDER — PREDNISONE 10 MG/1
TABLET ORAL
Qty: 21 TABLET | Refills: 0 | Status: SHIPPED | OUTPATIENT
Start: 2021-12-20 | End: 2022-04-04

## 2021-12-20 NOTE — PROGRESS NOTES
"Chief Complaint   Patient presents with   • Abdominal Pain     f/u    • Back Pain     f/u        HPI  Gerardo Mason is a 51 y.o. male presents for follow-up on epigastric pain.  Pt was scheduled for a CT scan but he states that he missed the appt [place].  He states he tried to cancel but he couldn't find a number to call.  The epigastric pain has not gotten any better at all.  He has occasional nausea but not frequent.  Denies any vomiting or diarrhea.  Pt is also still having pain in his right hip and thigh.  He states the pharmacy did not give him the Prednisone lurdes that was called in last visit.  Has improved \"some\"        The following portions of the patient's history were reviewed and updated as appropriate: allergies, current medications, past family history, past medical history, past social history, past surgical history and problem list.    Subjective  Review of Systems   Constitutional: Negative for activity change, appetite change and fatigue.   HENT: Negative for congestion.    Respiratory: Negative for cough and shortness of breath.    Cardiovascular: Negative for chest pain and leg swelling.   Gastrointestinal: Positive for nausea. Negative for abdominal pain (epigastric), blood in stool (\"last month\"), diarrhea, vomiting and GERD.   Neurological: Negative for dizziness, weakness and confusion.   Psychiatric/Behavioral: Negative for behavioral problems and decreased concentration.       Objective  Visit Vitals  /82   Pulse 69   Temp 97.1 °F (36.2 °C) (Temporal)   Ht 162.6 cm (64\")   Wt 72.3 kg (159 lb 6.4 oz)   SpO2 99%   BMI 27.36 kg/m²        Physical Exam  Vitals and nursing note reviewed.   HENT:      Head: Normocephalic.   Eyes:      Pupils: Pupils are equal, round, and reactive to light.   Cardiovascular:      Rate and Rhythm: Normal rate and regular rhythm.      Pulses: Normal pulses.      Heart sounds: Normal heart sounds.   Pulmonary:      Effort: Pulmonary effort " is normal.      Breath sounds: Normal breath sounds.   Abdominal:      Palpations: Abdomen is soft.      Tenderness: There is abdominal tenderness (epigastric). There is no guarding or rebound.   Skin:     General: Skin is warm and dry.      Capillary Refill: Capillary refill takes less than 2 seconds.   Neurological:      General: No focal deficit present.      Mental Status: He is alert and oriented to person, place, and time.      Gait: Gait is intact.   Psychiatric:         Attention and Perception: Attention normal.         Mood and Affect: Mood normal.         Behavior: Behavior normal.          Procedures     Assessment and Plan  Diagnoses and all orders for this visit:    1. Epigastric pain (Primary)    2. Sciatica, right side  -     predniSONE (DELTASONE) 10 MG (21) dose pack; Use as directed on package  Dispense: 21 tablet; Refill: 0    number given for pt to reschedule CT scan  Prednisone resent to pharm for sciatica  Pt still declines any Omperazole          GOYO Haas

## 2022-01-07 ENCOUNTER — APPOINTMENT (OUTPATIENT)
Dept: CT IMAGING | Facility: HOSPITAL | Age: 52
End: 2022-01-07

## 2022-01-13 RX ORDER — AMLODIPINE BESYLATE 10 MG/1
10 TABLET ORAL DAILY
Qty: 30 TABLET | Refills: 5 | Status: SHIPPED | OUTPATIENT
Start: 2022-01-13 | End: 2022-09-09 | Stop reason: SDUPTHER

## 2022-01-20 ENCOUNTER — HOSPITAL ENCOUNTER (OUTPATIENT)
Dept: CT IMAGING | Facility: HOSPITAL | Age: 52
Discharge: HOME | End: 2022-01-20
Admitting: NURSE PRACTITIONER

## 2022-01-20 DIAGNOSIS — R10.13 EPIGASTRIC PAIN: ICD-10-CM

## 2022-01-20 LAB — CREAT BLDA-MCNC: 1.2 MG/DL (ref 0.6–1.3)

## 2022-01-20 PROCEDURE — 82565 ASSAY OF CREATININE: CPT

## 2022-01-20 PROCEDURE — 25010000002 IOPAMIDOL 61 % SOLUTION: Performed by: NURSE PRACTITIONER

## 2022-01-20 PROCEDURE — 74160 CT ABDOMEN W/CONTRAST: CPT

## 2022-01-20 RX ADMIN — IOPAMIDOL 100 ML: 612 INJECTION, SOLUTION INTRAVENOUS at 14:58

## 2022-04-04 ENCOUNTER — OFFICE VISIT (OUTPATIENT)
Dept: INTERNAL MEDICINE | Facility: CLINIC | Age: 52
End: 2022-04-04

## 2022-04-04 VITALS
WEIGHT: 160.2 LBS | DIASTOLIC BLOOD PRESSURE: 78 MMHG | HEART RATE: 66 BPM | SYSTOLIC BLOOD PRESSURE: 124 MMHG | TEMPERATURE: 97.6 F | OXYGEN SATURATION: 99 % | HEIGHT: 64 IN | BODY MASS INDEX: 27.35 KG/M2

## 2022-04-04 DIAGNOSIS — R04.0 FREQUENT NOSEBLEEDS: Primary | ICD-10-CM

## 2022-04-04 DIAGNOSIS — E78.2 MIXED HYPERLIPIDEMIA: ICD-10-CM

## 2022-04-04 DIAGNOSIS — R51.9 WORSENING HEADACHES: ICD-10-CM

## 2022-04-04 LAB
ALBUMIN SERPL-MCNC: 4.7 G/DL (ref 3.5–5.2)
ALBUMIN/GLOB SERPL: 1.7 G/DL
ALP SERPL-CCNC: 92 U/L (ref 39–117)
ALT SERPL W P-5'-P-CCNC: 26 U/L (ref 1–41)
ANION GAP SERPL CALCULATED.3IONS-SCNC: 9.5 MMOL/L (ref 5–15)
AST SERPL-CCNC: 23 U/L (ref 1–40)
BASOPHILS # BLD AUTO: 0.01 10*3/MM3 (ref 0–0.2)
BASOPHILS NFR BLD AUTO: 0.3 % (ref 0–1.5)
BILIRUB SERPL-MCNC: 0.6 MG/DL (ref 0–1.2)
BUN SERPL-MCNC: 9 MG/DL (ref 6–20)
BUN/CREAT SERPL: 7.7 (ref 7–25)
CALCIUM SPEC-SCNC: 9.3 MG/DL (ref 8.6–10.5)
CHLORIDE SERPL-SCNC: 100 MMOL/L (ref 98–107)
CHOLEST SERPL-MCNC: 202 MG/DL (ref 0–200)
CO2 SERPL-SCNC: 28.5 MMOL/L (ref 22–29)
CREAT SERPL-MCNC: 1.17 MG/DL (ref 0.76–1.27)
DEPRECATED RDW RBC AUTO: 42.1 FL (ref 37–54)
EGFRCR SERPLBLD CKD-EPI 2021: 76.4 ML/MIN/1.73
EOSINOPHIL # BLD AUTO: 0.03 10*3/MM3 (ref 0–0.4)
EOSINOPHIL NFR BLD AUTO: 0.9 % (ref 0.3–6.2)
ERYTHROCYTE [DISTWIDTH] IN BLOOD BY AUTOMATED COUNT: 13.4 % (ref 12.3–15.4)
GLOBULIN UR ELPH-MCNC: 2.8 GM/DL
GLUCOSE SERPL-MCNC: 79 MG/DL (ref 65–99)
HCT VFR BLD AUTO: 42.8 % (ref 37.5–51)
HDLC SERPL-MCNC: 52 MG/DL (ref 40–60)
HGB BLD-MCNC: 15.2 G/DL (ref 13–17.7)
IMM GRANULOCYTES # BLD AUTO: 0.01 10*3/MM3 (ref 0–0.05)
IMM GRANULOCYTES NFR BLD AUTO: 0.3 % (ref 0–0.5)
LDLC SERPL CALC-MCNC: 135 MG/DL (ref 0–100)
LDLC/HDLC SERPL: 2.56 {RATIO}
LYMPHOCYTES # BLD AUTO: 1.93 10*3/MM3 (ref 0.7–3.1)
LYMPHOCYTES NFR BLD AUTO: 55 % (ref 19.6–45.3)
MCH RBC QN AUTO: 30.9 PG (ref 26.6–33)
MCHC RBC AUTO-ENTMCNC: 35.5 G/DL (ref 31.5–35.7)
MCV RBC AUTO: 87 FL (ref 79–97)
MONOCYTES # BLD AUTO: 0.37 10*3/MM3 (ref 0.1–0.9)
MONOCYTES NFR BLD AUTO: 10.5 % (ref 5–12)
NEUTROPHILS NFR BLD AUTO: 1.16 10*3/MM3 (ref 1.7–7)
NEUTROPHILS NFR BLD AUTO: 33 % (ref 42.7–76)
NRBC BLD AUTO-RTO: 0 /100 WBC (ref 0–0.2)
PLATELET # BLD AUTO: 188 10*3/MM3 (ref 140–450)
PMV BLD AUTO: 11.5 FL (ref 6–12)
POTASSIUM SERPL-SCNC: 3.5 MMOL/L (ref 3.5–5.2)
PROT SERPL-MCNC: 7.5 G/DL (ref 6–8.5)
RBC # BLD AUTO: 4.92 10*6/MM3 (ref 4.14–5.8)
SODIUM SERPL-SCNC: 138 MMOL/L (ref 136–145)
TRIGL SERPL-MCNC: 84 MG/DL (ref 0–150)
VLDLC SERPL-MCNC: 15 MG/DL (ref 5–40)
WBC NRBC COR # BLD: 3.51 10*3/MM3 (ref 3.4–10.8)

## 2022-04-04 PROCEDURE — 80061 LIPID PANEL: CPT | Performed by: NURSE PRACTITIONER

## 2022-04-04 PROCEDURE — 85025 COMPLETE CBC W/AUTO DIFF WBC: CPT | Performed by: NURSE PRACTITIONER

## 2022-04-04 PROCEDURE — 99214 OFFICE O/P EST MOD 30 MIN: CPT | Performed by: NURSE PRACTITIONER

## 2022-04-04 PROCEDURE — 80053 COMPREHEN METABOLIC PANEL: CPT | Performed by: NURSE PRACTITIONER

## 2022-04-04 PROCEDURE — 36415 COLL VENOUS BLD VENIPUNCTURE: CPT | Performed by: NURSE PRACTITIONER

## 2022-04-04 NOTE — PROGRESS NOTES
"Chief Complaint   Patient presents with   • Nose Bleed     X 2 months        HPI  Gerardo Mason is a 51 y.o. male presents for nosebleeds.  He states this has been going on for a couple months.  Occurs mostly at night when sleeping.  He states it's usually a unilateral bleed but both sides does bleed.  He has also been having slight headaches.  He states the headaches vary where they occur.  He denies any blurred vision other than when he takes his glasses off.  He does have nasal congestion before the bleeding occurs.   He has not had a nosebleed for about a week.  He states he has been getting the headaches for several years but they are starting to get more frequent.  He also has dizziness.  He is wanting to have a scan done of his head.    Has stopped Atorvastatin because his cholesterol was normal in November when labs were checked. He has started exercising.  He would like to recheck his lipids today.      The following portions of the patient's history were reviewed and updated as appropriate: allergies, current medications, past family history, past medical history, past social history, past surgical history and problem list.    Subjective  Review of Systems   Constitutional: Negative for activity change, appetite change and fatigue.   HENT: Positive for congestion and nosebleeds. Negative for sinus pressure.    Respiratory: Negative for cough and shortness of breath.    Cardiovascular: Negative for chest pain and leg swelling.   Gastrointestinal: Negative for abdominal pain.   Neurological: Positive for headache. Negative for dizziness, weakness and confusion.   Psychiatric/Behavioral: Negative for behavioral problems and decreased concentration.       Objective  Visit Vitals  /78   Pulse 66   Temp 97.6 °F (36.4 °C) (Temporal)   Ht 162.6 cm (64\")   Wt 72.7 kg (160 lb 3.2 oz)   SpO2 99%   BMI 27.50 kg/m²        Physical Exam  Vitals and nursing note reviewed.   HENT:      Head: Normocephalic.      " Nose: Mucosal edema and congestion present.      Right Nostril: No epistaxis.      Left Nostril: No epistaxis.      Right Sinus: No maxillary sinus tenderness or frontal sinus tenderness.      Left Sinus: No maxillary sinus tenderness or frontal sinus tenderness.   Eyes:      Pupils: Pupils are equal, round, and reactive to light.   Pulmonary:      Effort: Pulmonary effort is normal.   Skin:     General: Skin is warm and dry.      Capillary Refill: Capillary refill takes less than 2 seconds.   Neurological:      General: No focal deficit present.      Mental Status: He is alert and oriented to person, place, and time.      GCS: GCS eye subscore is 4. GCS verbal subscore is 5. GCS motor subscore is 6.      Motor: Motor function is intact.      Coordination: Coordination is intact.      Gait: Gait is intact.   Psychiatric:         Attention and Perception: Attention normal.         Mood and Affect: Mood normal.         Behavior: Behavior normal.          Procedures     Assessment and Plan  Diagnoses and all orders for this visit:    1. Frequent nosebleeds (Primary)    2. Mixed hyperlipidemia  -     CBC w AUTO Differential; Future  -     Comprehensive Metabolic Panel  -     Lipid Panel    3. Worsening headaches  -     MRI Brain With & Without Contrast; Future    Pt has stopped Claritin and Flonase  Recommend using a humidier at bedtime   Vaseline to the nares at bedtime  Check lipids today  Schedule MRI of head per pt request for worsening headaches  If nosebleeds persist will refer to ENT    Return in about 4 weeks (around 5/2/2022) for Recheck Nosebleeds.         GOYO Haas

## 2022-04-05 RX ORDER — ATORVASTATIN CALCIUM 10 MG/1
10 TABLET, FILM COATED ORAL NIGHTLY
Qty: 90 TABLET | Refills: 0 | Status: SHIPPED | OUTPATIENT
Start: 2022-04-05 | End: 2022-07-14

## 2022-04-27 ENCOUNTER — HOSPITAL ENCOUNTER (OUTPATIENT)
Dept: MRI IMAGING | Facility: HOSPITAL | Age: 52
Discharge: HOME | End: 2022-04-27
Admitting: NURSE PRACTITIONER

## 2022-04-27 DIAGNOSIS — R51.9 WORSENING HEADACHES: ICD-10-CM

## 2022-04-27 PROCEDURE — A9577 INJ MULTIHANCE: HCPCS | Performed by: NURSE PRACTITIONER

## 2022-04-27 PROCEDURE — 0 GADOBENATE DIMEGLUMINE 529 MG/ML SOLUTION: Performed by: NURSE PRACTITIONER

## 2022-04-27 PROCEDURE — 70553 MRI BRAIN STEM W/O & W/DYE: CPT

## 2022-04-27 RX ADMIN — GADOBENATE DIMEGLUMINE 15 ML: 529 INJECTION, SOLUTION INTRAVENOUS at 08:45

## 2022-05-02 ENCOUNTER — OFFICE VISIT (OUTPATIENT)
Dept: INTERNAL MEDICINE | Facility: CLINIC | Age: 52
End: 2022-05-02

## 2022-05-02 ENCOUNTER — HOSPITAL ENCOUNTER (OUTPATIENT)
Dept: GENERAL RADIOLOGY | Facility: HOSPITAL | Age: 52
Discharge: HOME | End: 2022-05-02
Admitting: NURSE PRACTITIONER

## 2022-05-02 VITALS
HEART RATE: 71 BPM | TEMPERATURE: 97.9 F | BODY MASS INDEX: 27.28 KG/M2 | DIASTOLIC BLOOD PRESSURE: 68 MMHG | SYSTOLIC BLOOD PRESSURE: 118 MMHG | OXYGEN SATURATION: 99 % | WEIGHT: 159.8 LBS | HEIGHT: 64 IN

## 2022-05-02 DIAGNOSIS — G89.29 CHRONIC RIGHT-SIDED LOW BACK PAIN WITH RIGHT-SIDED SCIATICA: ICD-10-CM

## 2022-05-02 DIAGNOSIS — M54.41 CHRONIC RIGHT-SIDED LOW BACK PAIN WITH RIGHT-SIDED SCIATICA: ICD-10-CM

## 2022-05-02 DIAGNOSIS — G44.89 OTHER HEADACHE SYNDROME: ICD-10-CM

## 2022-05-02 DIAGNOSIS — R04.0 FREQUENT NOSEBLEEDS: Primary | ICD-10-CM

## 2022-05-02 DIAGNOSIS — E78.2 MIXED HYPERLIPIDEMIA: ICD-10-CM

## 2022-05-02 PROCEDURE — 72100 X-RAY EXAM L-S SPINE 2/3 VWS: CPT

## 2022-05-02 PROCEDURE — 99214 OFFICE O/P EST MOD 30 MIN: CPT | Performed by: NURSE PRACTITIONER

## 2022-05-02 NOTE — PROGRESS NOTES
"Chief Complaint   Patient presents with   • Nose Bleed     F/u        HPI  Gerardo Mason is a 51 y.o. male presents for follow-up on nosebleeds and headaches.  He has had an MRI since his last visit that showed small vessel ischemic change.  Likely due to his history of HTN.  Pt states he started using a humidifier that was recommended at his last appt.  He states his headaches have also improved.  He continues to have pain in his lower right side of back, hip, and right thigh.  He has been given a steroid lurdes for this but he did not take it.      The following portions of the patient's history were reviewed and updated as appropriate: allergies, current medications, past family history, past medical history, past social history, past surgical history and problem list.    Subjective  Review of Systems   Constitutional: Negative for activity change, appetite change and fatigue.   HENT: Negative for congestion.    Respiratory: Negative for cough and shortness of breath.    Cardiovascular: Negative for chest pain and leg swelling.   Gastrointestinal: Negative for abdominal pain.   Musculoskeletal: Positive for arthralgias (right hip and thigh) and back pain.   Neurological: Negative for dizziness, weakness and confusion.   Psychiatric/Behavioral: Negative for behavioral problems and decreased concentration.       Objective  Visit Vitals  /68   Pulse 71   Temp 97.9 °F (36.6 °C) (Temporal)   Ht 162.6 cm (64\")   Wt 72.5 kg (159 lb 12.8 oz)   SpO2 99%   BMI 27.43 kg/m²        Physical Exam  Vitals and nursing note reviewed.   HENT:      Head: Normocephalic.   Eyes:      Pupils: Pupils are equal, round, and reactive to light.   Pulmonary:      Effort: Pulmonary effort is normal.   Musculoskeletal:        Back:    Skin:     General: Skin is warm and dry.      Capillary Refill: Capillary refill takes less than 2 seconds.   Neurological:      General: No focal deficit present.      Mental Status: He is alert and " oriented to person, place, and time.      Gait: Gait is intact.   Psychiatric:         Attention and Perception: Attention normal.         Mood and Affect: Mood normal.         Behavior: Behavior normal.          Procedures       Office Visit on 04/04/2022   Component Date Value Ref Range Status   • Glucose 04/04/2022 79  65 - 99 mg/dL Final   • BUN 04/04/2022 9  6 - 20 mg/dL Final   • Creatinine 04/04/2022 1.17  0.76 - 1.27 mg/dL Final   • Sodium 04/04/2022 138  136 - 145 mmol/L Final   • Potassium 04/04/2022 3.5  3.5 - 5.2 mmol/L Final   • Chloride 04/04/2022 100  98 - 107 mmol/L Final   • CO2 04/04/2022 28.5  22.0 - 29.0 mmol/L Final   • Calcium 04/04/2022 9.3  8.6 - 10.5 mg/dL Final   • Total Protein 04/04/2022 7.5  6.0 - 8.5 g/dL Final   • Albumin 04/04/2022 4.70  3.50 - 5.20 g/dL Final   • ALT (SGPT) 04/04/2022 26  1 - 41 U/L Final   • AST (SGOT) 04/04/2022 23  1 - 40 U/L Final   • Alkaline Phosphatase 04/04/2022 92  39 - 117 U/L Final   • Total Bilirubin 04/04/2022 0.6  0.0 - 1.2 mg/dL Final   • Globulin 04/04/2022 2.8  gm/dL Final   • A/G Ratio 04/04/2022 1.7  g/dL Final   • BUN/Creatinine Ratio 04/04/2022 7.7  7.0 - 25.0 Final   • Anion Gap 04/04/2022 9.5  5.0 - 15.0 mmol/L Final   • eGFR 04/04/2022 76.4  >60.0 mL/min/1.73 Final    National Kidney Foundation and American Society of Nephrology (ASN) Task Force recommended calculation based on the Chronic Kidney Disease Epidemiology Collaboration (CKD-EPI) equation refit without adjustment for race.   • Total Cholesterol 04/04/2022 202 (A) 0 - 200 mg/dL Final   • Triglycerides 04/04/2022 84  0 - 150 mg/dL Final   • HDL Cholesterol 04/04/2022 52  40 - 60 mg/dL Final   • LDL Cholesterol  04/04/2022 135 (A) 0 - 100 mg/dL Final   • VLDL Cholesterol 04/04/2022 15  5 - 40 mg/dL Final   • LDL/HDL Ratio 04/04/2022 2.56   Final   • WBC 04/04/2022 3.51  3.40 - 10.80 10*3/mm3 Final   • RBC 04/04/2022 4.92  4.14 - 5.80 10*6/mm3 Final   • Hemoglobin 04/04/2022 15.2  13.0  - 17.7 g/dL Final   • Hematocrit 04/04/2022 42.8  37.5 - 51.0 % Final   • MCV 04/04/2022 87.0  79.0 - 97.0 fL Final   • MCH 04/04/2022 30.9  26.6 - 33.0 pg Final   • MCHC 04/04/2022 35.5  31.5 - 35.7 g/dL Final   • RDW 04/04/2022 13.4  12.3 - 15.4 % Final   • RDW-SD 04/04/2022 42.1  37.0 - 54.0 fl Final   • MPV 04/04/2022 11.5  6.0 - 12.0 fL Final   • Platelets 04/04/2022 188  140 - 450 10*3/mm3 Final   • Neutrophil % 04/04/2022 33.0 (A) 42.7 - 76.0 % Final   • Lymphocyte % 04/04/2022 55.0 (A) 19.6 - 45.3 % Final   • Monocyte % 04/04/2022 10.5  5.0 - 12.0 % Final   • Eosinophil % 04/04/2022 0.9  0.3 - 6.2 % Final   • Basophil % 04/04/2022 0.3  0.0 - 1.5 % Final   • Immature Grans % 04/04/2022 0.3  0.0 - 0.5 % Final   • Neutrophils, Absolute 04/04/2022 1.16 (A) 1.70 - 7.00 10*3/mm3 Final   • Lymphocytes, Absolute 04/04/2022 1.93  0.70 - 3.10 10*3/mm3 Final   • Monocytes, Absolute 04/04/2022 0.37  0.10 - 0.90 10*3/mm3 Final   • Eosinophils, Absolute 04/04/2022 0.03  0.00 - 0.40 10*3/mm3 Final   • Basophils, Absolute 04/04/2022 0.01  0.00 - 0.20 10*3/mm3 Final   • Immature Grans, Absolute 04/04/2022 0.01  0.00 - 0.05 10*3/mm3 Final   • nRBC 04/04/2022 0.0  0.0 - 0.2 /100 WBC Final   Hospital Outpatient Visit on 01/20/2022   Component Date Value Ref Range Status   • Creatinine 01/20/2022 1.20  0.60 - 1.30 mg/dL Final    Serial Number: [id number]:  [id number]   ]      Assessment and Plan  Diagnoses and all orders for this visit:    1. Frequent nosebleeds (Primary)    2. Other headache syndrome    3. Chronic right-sided low back pain with right-sided sciatica  -     XR Spine Lumbar 2 or 3 View; Future  -     Ambulatory Referral to Physical Therapy Evaluate and treat    4. Mixed hyperlipidemia    Nosebleeds and headaches resolved with humidifier, likely allergy related  Pt does not wish to take steroids for sciatica  Refer to PT  Order for L-spine XR  Labs reviewed with pt  Cont statin for lipids    Return in about 8 weeks  (around 6/27/2022) for Recheck low back pain with sciatica.        Phil Tracey, APRN

## 2022-07-14 RX ORDER — ATORVASTATIN CALCIUM 10 MG/1
10 TABLET, FILM COATED ORAL NIGHTLY
Qty: 90 TABLET | Refills: 0 | Status: SHIPPED | OUTPATIENT
Start: 2022-07-14 | End: 2022-10-05

## 2022-09-09 RX ORDER — AMLODIPINE BESYLATE 10 MG/1
10 TABLET ORAL DAILY
Qty: 30 TABLET | Refills: 3 | Status: SHIPPED | OUTPATIENT
Start: 2022-09-09 | End: 2022-10-05 | Stop reason: SDUPTHER

## 2022-10-05 ENCOUNTER — LAB (OUTPATIENT)
Dept: LAB | Facility: HOSPITAL | Age: 52
End: 2022-10-05

## 2022-10-05 ENCOUNTER — OFFICE VISIT (OUTPATIENT)
Dept: INTERNAL MEDICINE | Facility: CLINIC | Age: 52
End: 2022-10-05

## 2022-10-05 VITALS
OXYGEN SATURATION: 99 % | TEMPERATURE: 97.5 F | BODY MASS INDEX: 25.78 KG/M2 | SYSTOLIC BLOOD PRESSURE: 122 MMHG | HEIGHT: 64 IN | DIASTOLIC BLOOD PRESSURE: 78 MMHG | HEART RATE: 69 BPM | WEIGHT: 151 LBS

## 2022-10-05 DIAGNOSIS — I10 ESSENTIAL HYPERTENSION: ICD-10-CM

## 2022-10-05 DIAGNOSIS — M54.50 CHRONIC RIGHT-SIDED LOW BACK PAIN WITHOUT SCIATICA: ICD-10-CM

## 2022-10-05 DIAGNOSIS — Z12.5 SCREENING FOR PROSTATE CANCER: ICD-10-CM

## 2022-10-05 DIAGNOSIS — Z00.00 ROUTINE PHYSICAL EXAMINATION: Primary | ICD-10-CM

## 2022-10-05 DIAGNOSIS — Z12.11 ENCOUNTER FOR SCREENING COLONOSCOPY: ICD-10-CM

## 2022-10-05 DIAGNOSIS — G89.29 CHRONIC RIGHT-SIDED LOW BACK PAIN WITHOUT SCIATICA: ICD-10-CM

## 2022-10-05 DIAGNOSIS — E78.2 MIXED HYPERLIPIDEMIA: ICD-10-CM

## 2022-10-05 DIAGNOSIS — Z11.59 ENCOUNTER FOR HEPATITIS C SCREENING TEST FOR LOW RISK PATIENT: ICD-10-CM

## 2022-10-05 DIAGNOSIS — H69.83 EUSTACHIAN TUBE DYSFUNCTION, BILATERAL: ICD-10-CM

## 2022-10-05 DIAGNOSIS — Z00.00 ROUTINE PHYSICAL EXAMINATION: ICD-10-CM

## 2022-10-05 LAB
ALBUMIN SERPL-MCNC: 4.5 G/DL (ref 3.5–5.2)
ALBUMIN/GLOB SERPL: 1.7 G/DL
ALP SERPL-CCNC: 97 U/L (ref 39–117)
ALT SERPL W P-5'-P-CCNC: 22 U/L (ref 1–41)
ANION GAP SERPL CALCULATED.3IONS-SCNC: 8.7 MMOL/L (ref 5–15)
AST SERPL-CCNC: 26 U/L (ref 1–40)
BILIRUB BLD-MCNC: NEGATIVE MG/DL
BILIRUB SERPL-MCNC: 0.5 MG/DL (ref 0–1.2)
BUN SERPL-MCNC: 13 MG/DL (ref 6–20)
BUN/CREAT SERPL: 10.7 (ref 7–25)
CALCIUM SPEC-SCNC: 9 MG/DL (ref 8.6–10.5)
CHLORIDE SERPL-SCNC: 104 MMOL/L (ref 98–107)
CHOLEST SERPL-MCNC: 199 MG/DL (ref 0–200)
CLARITY, POC: CLEAR
CO2 SERPL-SCNC: 29.3 MMOL/L (ref 22–29)
COLOR UR: YELLOW
CREAT SERPL-MCNC: 1.21 MG/DL (ref 0.76–1.27)
EGFRCR SERPLBLD CKD-EPI 2021: 72.9 ML/MIN/1.73
EXPIRATION DATE: ABNORMAL
GLOBULIN UR ELPH-MCNC: 2.7 GM/DL
GLUCOSE SERPL-MCNC: 98 MG/DL (ref 65–99)
GLUCOSE UR STRIP-MCNC: NEGATIVE MG/DL
HCV AB SER DONR QL: NORMAL
HDLC SERPL-MCNC: 54 MG/DL (ref 40–60)
KETONES UR QL: NEGATIVE
LDLC SERPL CALC-MCNC: 135 MG/DL (ref 0–100)
LDLC/HDLC SERPL: 2.49 {RATIO}
LEUKOCYTE EST, POC: NEGATIVE
Lab: ABNORMAL
NITRITE UR-MCNC: NEGATIVE MG/ML
PH UR: 6 [PH] (ref 5–8)
POTASSIUM SERPL-SCNC: 3.6 MMOL/L (ref 3.5–5.2)
PROT SERPL-MCNC: 7.2 G/DL (ref 6–8.5)
PROT UR STRIP-MCNC: ABNORMAL MG/DL
PSA SERPL-MCNC: 0.7 NG/ML (ref 0–4)
RBC # UR STRIP: NEGATIVE /UL
SODIUM SERPL-SCNC: 142 MMOL/L (ref 136–145)
SP GR UR: 1.01 (ref 1–1.03)
TRIGL SERPL-MCNC: 53 MG/DL (ref 0–150)
TSH SERPL DL<=0.05 MIU/L-ACNC: 1.23 UIU/ML (ref 0.27–4.2)
UROBILINOGEN UR QL: NORMAL
VLDLC SERPL-MCNC: 10 MG/DL (ref 5–40)

## 2022-10-05 PROCEDURE — G0103 PSA SCREENING: HCPCS

## 2022-10-05 PROCEDURE — 99396 PREV VISIT EST AGE 40-64: CPT | Performed by: NURSE PRACTITIONER

## 2022-10-05 PROCEDURE — 86803 HEPATITIS C AB TEST: CPT | Performed by: NURSE PRACTITIONER

## 2022-10-05 PROCEDURE — 80053 COMPREHEN METABOLIC PANEL: CPT | Performed by: NURSE PRACTITIONER

## 2022-10-05 PROCEDURE — 81003 URINALYSIS AUTO W/O SCOPE: CPT | Performed by: NURSE PRACTITIONER

## 2022-10-05 PROCEDURE — 80061 LIPID PANEL: CPT | Performed by: NURSE PRACTITIONER

## 2022-10-05 PROCEDURE — 36415 COLL VENOUS BLD VENIPUNCTURE: CPT | Performed by: NURSE PRACTITIONER

## 2022-10-05 PROCEDURE — 85025 COMPLETE CBC W/AUTO DIFF WBC: CPT

## 2022-10-05 PROCEDURE — 84443 ASSAY THYROID STIM HORMONE: CPT | Performed by: NURSE PRACTITIONER

## 2022-10-05 RX ORDER — FLUTICASONE PROPIONATE 50 MCG
2 SPRAY, SUSPENSION (ML) NASAL DAILY
Qty: 48 G | Refills: 2 | Status: SHIPPED | OUTPATIENT
Start: 2022-10-05 | End: 2023-02-07

## 2022-10-05 RX ORDER — AMLODIPINE BESYLATE 10 MG/1
10 TABLET ORAL DAILY
Qty: 90 TABLET | Refills: 2 | Status: SHIPPED | OUTPATIENT
Start: 2022-10-05

## 2022-10-05 RX ORDER — LORATADINE 10 MG/1
10 TABLET ORAL DAILY
Qty: 30 TABLET | Refills: 1 | Status: SHIPPED | OUTPATIENT
Start: 2022-10-05 | End: 2023-02-07

## 2022-10-05 NOTE — PROGRESS NOTES
Subjective   Chief Complaint   Patient presents with   • Annual Exam        Gerardo Mason is a 52 y.o. male here today for annual exam.    Overall healthy: Yes  Regular exercise:  No  Diet is well balanced:  Yes  Vitamin Supplement:  No  Alcohol intake:  Yes, socially   Tobacco use:  No    Cardiovascular risk is low:  Yes   PSA: Due  ED or bedroom issues: No  Concern for STDs:  No  Last colon screening:  Due, polyps, last one 2019  In [place]  Regular dental exam:  No  Regular eye exam:  Yes, wears glasses  Immunizations up to date:  Yes  Wear a seatbelt regularly:  Yes  Wear sunscreen regularly when outdoors:  No    Review of Systems   Constitutional: Negative for activity change, appetite change and fatigue.   HENT: Positive for congestion.    Respiratory: Negative for cough and shortness of breath.    Cardiovascular: Negative for chest pain and leg swelling.   Gastrointestinal: Negative for abdominal pain.   Musculoskeletal: Positive for back pain.   Neurological: Positive for dizziness and headache. Negative for weakness and confusion.   Psychiatric/Behavioral: Negative for behavioral problems and decreased concentration.       The following portions of the patient's history were reviewed and updated as appropriate: allergies, current medications, past family history, past medical history, past social history, past surgical history and problem list.    Past Medical History:   Diagnosis Date   • Colon polyp    • GERD (gastroesophageal reflux disease)    • Heart murmur    • Peptic ulceration      Past Surgical History:   Procedure Laterality Date   • PTERYGIUM EXCISION       Family History   Problem Relation Age of Onset   • Alzheimer's disease Mother      Social History     Tobacco Use   Smoking Status Never Smoker   Smokeless Tobacco Never Used     Social History     Substance and Sexual Activity   Alcohol Use Never     Allergies   Allergen Reactions   • Penicillins Unknown - Low Severity       Current Outpatient  "Medications on File Prior to Visit   Medication Sig   • [DISCONTINUED] amLODIPine (NORVASC) 10 MG tablet Take 1 tablet by mouth Daily.   • [DISCONTINUED] atorvastatin (LIPITOR) 10 MG tablet TAKE 1 TABLET BY MOUTH EVERY NIGHT     No current facility-administered medications on file prior to visit.       Objective   Vitals:    10/05/22 0937   BP: 122/78   BP Location: Left arm   Patient Position: Sitting   Pulse: 69   Temp: 97.5 °F (36.4 °C)   SpO2: 99%   Weight: 68.5 kg (151 lb)   Height: 162.6 cm (64\")     Body mass index is 25.92 kg/m².    Physical Exam  Vitals and nursing note reviewed.   HENT:      Head: Normocephalic.      Right Ear: Ear canal and external ear normal. A middle ear effusion is present.      Left Ear: Ear canal and external ear normal. A middle ear effusion is present.      Mouth/Throat:      Mouth: Mucous membranes are moist.      Pharynx: Oropharynx is clear.   Eyes:      Extraocular Movements: Extraocular movements intact.      Conjunctiva/sclera: Conjunctivae normal.      Pupils: Pupils are equal, round, and reactive to light.   Neck:      Thyroid: No thyromegaly or thyroid tenderness.      Vascular: No carotid bruit.   Cardiovascular:      Rate and Rhythm: Normal rate and regular rhythm.      Pulses: Normal pulses.      Heart sounds: Normal heart sounds.   Pulmonary:      Effort: Pulmonary effort is normal.      Breath sounds: Normal breath sounds.   Abdominal:      General: Bowel sounds are normal. There is no distension.      Palpations: Abdomen is soft.      Tenderness: There is no abdominal tenderness.   Musculoskeletal:      Comments: Full  ROM of major joints   Lymphadenopathy:      Cervical: No cervical adenopathy.   Skin:     General: Skin is warm and dry.      Capillary Refill: Capillary refill takes less than 2 seconds.   Neurological:      General: No focal deficit present.      Mental Status: He is alert and oriented to person, place, and time.      GCS: GCS eye subscore is 4. GCS " verbal subscore is 5. GCS motor subscore is 6.      Motor: Motor function is intact.      Coordination: Coordination is intact.      Gait: Gait is intact.   Psychiatric:         Attention and Perception: Attention normal.         Mood and Affect: Mood normal.         Behavior: Behavior normal.         Thought Content: Thought content normal.         Cognition and Memory: Cognition and memory normal.         Judgment: Judgment normal.         BMI is >= 25 and <30. (Overweight) The following options were offered after discussion;: exercise counseling/recommendations       Assessment & Plan     Current Outpatient Medications:   •  amLODIPine (NORVASC) 10 MG tablet, Take 1 tablet by mouth Daily., Disp: 90 tablet, Rfl: 2  •  fluticasone (Flonase) 50 MCG/ACT nasal spray, 2 sprays into the nostril(s) as directed by provider Daily., Disp: 48 g, Rfl: 2  •  loratadine (Claritin) 10 MG tablet, Take 1 tablet by mouth Daily., Disp: 30 tablet, Rfl: 1      Results for orders placed or performed in visit on 10/05/22   POCT urinalysis dipstick, automated    Specimen: Urine   Result Value Ref Range    Color Yellow Yellow, Straw, Dark Yellow, Esha    Clarity, UA Clear Clear    Specific Gravity  1.015 1.005 - 1.030    pH, Urine 6.0 5.0 - 8.0    Leukocytes Negative Negative    Nitrite, UA Negative Negative    Protein, POC Trace (A) Negative mg/dL    Glucose, UA Negative Negative mg/dL    Ketones, UA Negative Negative    Urobilinogen, UA Normal Normal, 0.2 E.U./dL    Bilirubin Negative Negative    Blood, UA Negative Negative    Lot Number [id number]     Expiration Date 10/02/2024          Problem List Items Addressed This Visit    None     Visit Diagnoses     Routine physical examination    -  Primary    Relevant Orders    CBC w AUTO Differential    Comprehensive Metabolic Panel    TSH Rfx On Abnormal To Free T4    Essential hypertension        Relevant Medications    amLODIPine (NORVASC) 10 MG tablet    Encounter for screening  colonoscopy        Relevant Orders    Ambulatory Referral For Screening Colonoscopy    Screening for prostate cancer        Relevant Orders    PSA Screen    Mixed hyperlipidemia        Relevant Orders    Lipid Panel    Encounter for hepatitis C screening test for low risk patient        Relevant Orders    Hepatitis C Antibody    Eustachian tube dysfunction, bilateral        Chronic right-sided low back pain without sciatica        Relevant Orders    POCT urinalysis dipstick, automated (Completed)         Claritin/Flonase for congestion/fluid behind TMs  Labs today  Amlodipine refilled       Counseling was given to patient for the following topics: appropriate exercise, healthy eating habits, disease prevention and importance of self testicular exam.     Plan of care reviewed with the patient at the conclusion of today's visit.  Education was provided regarding diagnosis, management, and any prescribed or recommended OTC medications.  Patient verbalized understanding of and agreement with management plan.     Return in about 4 months (around 2/5/2023) for Hyperlipidemia/HTN.        GOYO Haas

## 2022-10-06 DIAGNOSIS — R79.89 ABNORMAL CBC: Primary | ICD-10-CM

## 2022-10-06 LAB
BASOPHILS # BLD AUTO: 0.01 10*3/MM3 (ref 0–0.2)
BASOPHILS NFR BLD AUTO: 0.3 % (ref 0–1.5)
DEPRECATED RDW RBC AUTO: 42.7 FL (ref 37–54)
EOSINOPHIL # BLD AUTO: 0.05 10*3/MM3 (ref 0–0.4)
EOSINOPHIL NFR BLD AUTO: 1.6 % (ref 0.3–6.2)
ERYTHROCYTE [DISTWIDTH] IN BLOOD BY AUTOMATED COUNT: 13 % (ref 12.3–15.4)
HCT VFR BLD AUTO: 43.6 % (ref 37.5–51)
HGB BLD-MCNC: 15.4 G/DL (ref 13–17.7)
IMM GRANULOCYTES # BLD AUTO: 0.01 10*3/MM3 (ref 0–0.05)
IMM GRANULOCYTES NFR BLD AUTO: 0.3 % (ref 0–0.5)
LYMPHOCYTES # BLD AUTO: 1.67 10*3/MM3 (ref 0.7–3.1)
LYMPHOCYTES NFR BLD AUTO: 52.2 % (ref 19.6–45.3)
MCH RBC QN AUTO: 31.2 PG (ref 26.6–33)
MCHC RBC AUTO-ENTMCNC: 35.3 G/DL (ref 31.5–35.7)
MCV RBC AUTO: 88.4 FL (ref 79–97)
MONOCYTES # BLD AUTO: 0.43 10*3/MM3 (ref 0.1–0.9)
MONOCYTES NFR BLD AUTO: 13.4 % (ref 5–12)
NEUTROPHILS NFR BLD AUTO: 1.03 10*3/MM3 (ref 1.7–7)
NEUTROPHILS NFR BLD AUTO: 32.2 % (ref 42.7–76)
NRBC BLD AUTO-RTO: 0 /100 WBC (ref 0–0.2)
PLATELET # BLD AUTO: 167 10*3/MM3 (ref 140–450)
PMV BLD AUTO: 11.6 FL (ref 6–12)
RBC # BLD AUTO: 4.93 10*6/MM3 (ref 4.14–5.8)
WBC NRBC COR # BLD: 3.2 10*3/MM3 (ref 3.4–10.8)

## 2022-10-18 ENCOUNTER — TELEPHONE (OUTPATIENT)
Dept: INTERNAL MEDICINE | Facility: CLINIC | Age: 52
End: 2022-10-18

## 2022-10-18 DIAGNOSIS — Z29.8 NEED FOR MALARIA PROPHYLAXIS: Primary | ICD-10-CM

## 2022-10-18 RX ORDER — DOXYCYCLINE 100 MG/1
CAPSULE ORAL
Qty: 30 CAPSULE | Refills: 1 | Status: SHIPPED | OUTPATIENT
Start: 2022-10-18 | End: 2023-02-07

## 2022-10-18 NOTE — TELEPHONE ENCOUNTER
Caller: Gerardo Mason    Relationship: Self    Best call back number: [phone]    What medication are you requesting: MEDICATION TO PREVENT MALARIA FROM [place] BITES WHEN TRAVELING     What are your current symptoms: TRAVELING OUTSIDE OF THE COUNTRY     How long have you been experiencing symptoms:     Have you had these symptoms before:    [] Yes  [] No    Have you been treated for these symptoms before:   [] Yes  [] No    If a prescription is needed, what is your preferred pharmacy and phone number: [id number] - [facility] [address] [place] AT [facility] [address] - [phone] [facility] [phone]      Additional notes: PATIENT STATES HE WILL BE LEAVING SATURDAY TO GO TO [facility] AND WILL BE GONE FOR TWO WEEKS.

## 2022-10-20 ENCOUNTER — LAB (OUTPATIENT)
Dept: LAB | Facility: HOSPITAL | Age: 52
End: 2022-10-20

## 2022-10-20 DIAGNOSIS — R79.89 ABNORMAL CBC: ICD-10-CM

## 2022-10-20 PROCEDURE — 85025 COMPLETE CBC W/AUTO DIFF WBC: CPT

## 2022-10-20 PROCEDURE — 36415 COLL VENOUS BLD VENIPUNCTURE: CPT

## 2022-10-21 LAB
BASOPHILS # BLD AUTO: 0.01 10*3/MM3 (ref 0–0.2)
BASOPHILS NFR BLD AUTO: 0.3 % (ref 0–1.5)
DEPRECATED RDW RBC AUTO: 43.5 FL (ref 37–54)
EOSINOPHIL # BLD AUTO: 0.04 10*3/MM3 (ref 0–0.4)
EOSINOPHIL NFR BLD AUTO: 1.2 % (ref 0.3–6.2)
ERYTHROCYTE [DISTWIDTH] IN BLOOD BY AUTOMATED COUNT: 13 % (ref 12.3–15.4)
HCT VFR BLD AUTO: 43.7 % (ref 37.5–51)
HGB BLD-MCNC: 14.9 G/DL (ref 13–17.7)
IMM GRANULOCYTES # BLD AUTO: 0 10*3/MM3 (ref 0–0.05)
IMM GRANULOCYTES NFR BLD AUTO: 0 % (ref 0–0.5)
LYMPHOCYTES # BLD AUTO: 1.72 10*3/MM3 (ref 0.7–3.1)
LYMPHOCYTES NFR BLD AUTO: 52.9 % (ref 19.6–45.3)
MCH RBC QN AUTO: 30.9 PG (ref 26.6–33)
MCHC RBC AUTO-ENTMCNC: 34.1 G/DL (ref 31.5–35.7)
MCV RBC AUTO: 90.7 FL (ref 79–97)
MONOCYTES # BLD AUTO: 0.4 10*3/MM3 (ref 0.1–0.9)
MONOCYTES NFR BLD AUTO: 12.3 % (ref 5–12)
NEUTROPHILS NFR BLD AUTO: 1.08 10*3/MM3 (ref 1.7–7)
NEUTROPHILS NFR BLD AUTO: 33.3 % (ref 42.7–76)
NRBC BLD AUTO-RTO: 0 /100 WBC (ref 0–0.2)
PLATELET # BLD AUTO: 184 10*3/MM3 (ref 140–450)
PMV BLD AUTO: 11.7 FL (ref 6–12)
RBC # BLD AUTO: 4.82 10*6/MM3 (ref 4.14–5.8)
WBC NRBC COR # BLD: 3.25 10*3/MM3 (ref 3.4–10.8)

## 2022-11-14 ENCOUNTER — TELEPHONE (OUTPATIENT)
Dept: INTERNAL MEDICINE | Facility: CLINIC | Age: 52
End: 2022-11-14

## 2023-01-03 ENCOUNTER — PRIOR AUTHORIZATION (OUTPATIENT)
Dept: GASTROENTEROLOGY | Facility: CLINIC | Age: 53
End: 2023-01-03
Payer: MEDICAID

## 2023-02-07 ENCOUNTER — OFFICE VISIT (OUTPATIENT)
Dept: INTERNAL MEDICINE | Facility: CLINIC | Age: 53
End: 2023-02-07
Payer: MEDICAID

## 2023-02-07 VITALS
DIASTOLIC BLOOD PRESSURE: 82 MMHG | SYSTOLIC BLOOD PRESSURE: 118 MMHG | HEART RATE: 68 BPM | OXYGEN SATURATION: 97 % | HEIGHT: 64 IN | WEIGHT: 145 LBS | BODY MASS INDEX: 24.75 KG/M2 | TEMPERATURE: 97.3 F

## 2023-02-07 DIAGNOSIS — R10.11 RIGHT UPPER QUADRANT PAIN: ICD-10-CM

## 2023-02-07 DIAGNOSIS — J02.9 SORE THROAT: Primary | ICD-10-CM

## 2023-02-07 LAB
EXPIRATION DATE: NORMAL
INTERNAL CONTROL: NORMAL
Lab: NORMAL
S PYO AG THROAT QL: NEGATIVE

## 2023-02-07 PROCEDURE — 87880 STREP A ASSAY W/OPTIC: CPT | Performed by: NURSE PRACTITIONER

## 2023-02-07 PROCEDURE — 99213 OFFICE O/P EST LOW 20 MIN: CPT | Performed by: NURSE PRACTITIONER

## 2023-02-07 RX ORDER — AZITHROMYCIN 250 MG/1
TABLET, FILM COATED ORAL
Qty: 6 TABLET | Refills: 0 | Status: SHIPPED | OUTPATIENT
Start: 2023-02-07 | End: 2023-02-12

## 2023-02-07 NOTE — PROGRESS NOTES
"Chief Complaint  Sore Throat    Subjective        Gerardo Mason presents to [facility]  History of Present Illness  The patient reports symptoms of sore throat for two days, headache, congestion in the throat, and chills. He has been exposed to Strep A pharyngitis. Right upper quadrant pain developed yesterday. Pain is intermittent, sharp, stabbing. Initially RUQ pain was 7 but is now a 3-4. No nausea, vomiting, diarrhea but increased bowel movements. He has two bowel movements daily. No change in color of bowel movement. No fever.  No nausea, vomiting, diarrhea.     Review of Systems   HENT: Positive for congestion, sore throat and trouble swallowing. Negative for drooling and ear discharge.    Respiratory: Positive for stridor. Negative for cough and shortness of breath.    Gastrointestinal: Positive for abdominal pain. Negative for diarrhea and vomiting.   Musculoskeletal: Negative for neck pain.   All other systems reviewed and are negative.    Objective   Vital Signs:  /82   Pulse 68   Temp 97.3 °F (36.3 °C)   Ht 162.6 cm (64\")   Wt 65.8 kg (145 lb)   SpO2 97%   BMI 24.89 kg/m²   Estimated body mass index is 24.89 kg/m² as calculated from the following:    Height as of this encounter: 162.6 cm (64\").    Weight as of this encounter: 65.8 kg (145 lb).       BMI is within normal parameters. No other follow-up for BMI required.      Physical Exam  Constitutional:       Appearance: Normal appearance. He is normal weight.   HENT:      Head: Normocephalic.      Right Ear: Tympanic membrane, ear canal and external ear normal.      Left Ear: Tympanic membrane, ear canal and external ear normal.      Nose: Nose normal.      Mouth/Throat:      Mouth: Mucous membranes are moist.      Pharynx: Posterior oropharyngeal erythema present. No oropharyngeal exudate.   Eyes:      Conjunctiva/sclera: Conjunctivae normal.   Cardiovascular:      Rate and Rhythm: Normal rate and regular " rhythm.      Heart sounds: Normal heart sounds.   Pulmonary:      Effort: Pulmonary effort is normal.      Breath sounds: Normal breath sounds.   Abdominal:      General: Bowel sounds are normal. There is no distension.      Palpations: Abdomen is soft. There is no mass.      Tenderness: There is abdominal tenderness. There is no guarding or rebound.      Hernia: No hernia is present.      Comments: Positive koch's sign   Musculoskeletal:         General: Normal range of motion.      Cervical back: Normal range of motion and neck supple.   Lymphadenopathy:      Cervical: No cervical adenopathy.   Skin:     General: Skin is warm.      Capillary Refill: Capillary refill takes less than 2 seconds.   Neurological:      General: No focal deficit present.      Mental Status: He is alert.   Psychiatric:         Mood and Affect: Mood normal.         Behavior: Behavior normal.         Thought Content: Thought content normal.         Judgment: Judgment normal.        Result Review :                   Assessment and Plan   Diagnoses and all orders for this visit:    1. Sore throat (Primary)  -     POCT rapid strep A  -     azithromycin (ZITHROMAX) 250 MG tablet; Take 2 tablets the first day, then 1 tablet daily for 4 days.  Dispense: 6 tablet; Refill: 0    2. Right upper quadrant pain  -     US Gallbladder; Future    Reviewed exam and lab results with patient. Medication as directed. Will notify of ultrasound result. Recommend low fat/high fiber diet. Keep routine follow up appointment.         Follow Up   No follow-ups on file.  Patient was given instructions and counseling regarding his condition or for health maintenance advice. Please see specific information pulled into the AVS if appropriate.       Answers for HPI/ROS submitted by the patient on 2/7/2023  What is the primary reason for your visit?: Sore Throat

## 2023-02-27 ENCOUNTER — HOSPITAL ENCOUNTER (OUTPATIENT)
Dept: ULTRASOUND IMAGING | Facility: HOSPITAL | Age: 53
Discharge: HOME | End: 2023-02-27
Admitting: NURSE PRACTITIONER
Payer: MEDICAID

## 2023-02-27 DIAGNOSIS — R10.11 RIGHT UPPER QUADRANT PAIN: ICD-10-CM

## 2023-02-27 PROCEDURE — 76705 ECHO EXAM OF ABDOMEN: CPT

## 2023-02-28 ENCOUNTER — TELEPHONE (OUTPATIENT)
Dept: INTERNAL MEDICINE | Facility: CLINIC | Age: 53
End: 2023-02-28
Payer: MEDICAID

## 2023-02-28 NOTE — TELEPHONE ENCOUNTER
----- Message from GOYO Pickett sent at 2/28/2023  2:43 PM EST -----  No acute sonographic abnormality. Increased Echogenicity of the liver, likely related to steatosis. Recommend low fat/high fiber diet. Follow up for any further symptoms.

## 2023-02-28 NOTE — TELEPHONE ENCOUNTER
Called and spoke with patient and relayed results. Patient verbalized understanding and had no further questions for the provider.

## 2023-03-29 ENCOUNTER — LAB (OUTPATIENT)
Dept: LAB | Facility: HOSPITAL | Age: 53
End: 2023-03-29
Payer: MEDICAID

## 2023-03-29 ENCOUNTER — HOSPITAL ENCOUNTER (OUTPATIENT)
Dept: CT IMAGING | Facility: HOSPITAL | Age: 53
Discharge: HOME | End: 2023-03-29
Payer: MEDICAID

## 2023-03-29 ENCOUNTER — OFFICE VISIT (OUTPATIENT)
Dept: INTERNAL MEDICINE | Facility: CLINIC | Age: 53
End: 2023-03-29
Payer: MEDICAID

## 2023-03-29 VITALS
HEART RATE: 68 BPM | DIASTOLIC BLOOD PRESSURE: 72 MMHG | OXYGEN SATURATION: 99 % | BODY MASS INDEX: 25.95 KG/M2 | HEIGHT: 64 IN | WEIGHT: 152 LBS | TEMPERATURE: 98.5 F | SYSTOLIC BLOOD PRESSURE: 120 MMHG

## 2023-03-29 DIAGNOSIS — R10.13 EPIGASTRIC PAIN: Primary | ICD-10-CM

## 2023-03-29 DIAGNOSIS — R10.13 EPIGASTRIC PAIN: ICD-10-CM

## 2023-03-29 DIAGNOSIS — R11.0 NAUSEA: ICD-10-CM

## 2023-03-29 DIAGNOSIS — K59.09 OTHER CONSTIPATION: ICD-10-CM

## 2023-03-29 LAB
ALBUMIN SERPL-MCNC: 4.4 G/DL (ref 3.5–5.2)
ALBUMIN/GLOB SERPL: 1.4 G/DL
ALP SERPL-CCNC: 94 U/L (ref 39–117)
ALT SERPL W P-5'-P-CCNC: 26 U/L (ref 1–41)
ANION GAP SERPL CALCULATED.3IONS-SCNC: 9 MMOL/L (ref 5–15)
AST SERPL-CCNC: 26 U/L (ref 1–40)
BASOPHILS # BLD AUTO: 0.02 10*3/MM3 (ref 0–0.2)
BASOPHILS NFR BLD AUTO: 0.5 % (ref 0–1.5)
BILIRUB BLD-MCNC: NEGATIVE MG/DL
BILIRUB SERPL-MCNC: 0.4 MG/DL (ref 0–1.2)
BUN SERPL-MCNC: 11 MG/DL (ref 6–20)
BUN/CREAT SERPL: 9.5 (ref 7–25)
CALCIUM SPEC-SCNC: 9.2 MG/DL (ref 8.6–10.5)
CHLORIDE SERPL-SCNC: 105 MMOL/L (ref 98–107)
CLARITY, POC: CLEAR
CO2 SERPL-SCNC: 30 MMOL/L (ref 22–29)
COLOR UR: YELLOW
CREAT SERPL-MCNC: 1.16 MG/DL (ref 0.76–1.27)
DEPRECATED RDW RBC AUTO: 43.2 FL (ref 37–54)
EGFRCR SERPLBLD CKD-EPI 2021: 76.7 ML/MIN/1.73
EOSINOPHIL # BLD AUTO: 0.04 10*3/MM3 (ref 0–0.4)
EOSINOPHIL NFR BLD AUTO: 1 % (ref 0.3–6.2)
ERYTHROCYTE [DISTWIDTH] IN BLOOD BY AUTOMATED COUNT: 13 % (ref 12.3–15.4)
EXPIRATION DATE: NORMAL
GLOBULIN UR ELPH-MCNC: 3.2 GM/DL
GLUCOSE SERPL-MCNC: 97 MG/DL (ref 65–99)
GLUCOSE UR STRIP-MCNC: NEGATIVE MG/DL
HCT VFR BLD AUTO: 48.3 % (ref 37.5–51)
HGB BLD-MCNC: 16.3 G/DL (ref 13–17.7)
IMM GRANULOCYTES # BLD AUTO: 0.01 10*3/MM3 (ref 0–0.05)
IMM GRANULOCYTES NFR BLD AUTO: 0.3 % (ref 0–0.5)
KETONES UR QL: NEGATIVE
LEUKOCYTE EST, POC: NEGATIVE
LIPASE SERPL-CCNC: 21 U/L (ref 13–60)
LYMPHOCYTES # BLD AUTO: 2.06 10*3/MM3 (ref 0.7–3.1)
LYMPHOCYTES NFR BLD AUTO: 53.2 % (ref 19.6–45.3)
Lab: NORMAL
MCH RBC QN AUTO: 30.4 PG (ref 26.6–33)
MCHC RBC AUTO-ENTMCNC: 33.7 G/DL (ref 31.5–35.7)
MCV RBC AUTO: 90.1 FL (ref 79–97)
MONOCYTES # BLD AUTO: 0.34 10*3/MM3 (ref 0.1–0.9)
MONOCYTES NFR BLD AUTO: 8.8 % (ref 5–12)
NEUTROPHILS NFR BLD AUTO: 1.4 10*3/MM3 (ref 1.7–7)
NEUTROPHILS NFR BLD AUTO: 36.2 % (ref 42.7–76)
NITRITE UR-MCNC: NEGATIVE MG/ML
NRBC BLD AUTO-RTO: 0.3 /100 WBC (ref 0–0.2)
PH UR: 7 [PH] (ref 5–8)
PLATELET # BLD AUTO: 188 10*3/MM3 (ref 140–450)
PMV BLD AUTO: 11.8 FL (ref 6–12)
POTASSIUM SERPL-SCNC: 3.6 MMOL/L (ref 3.5–5.2)
PROT SERPL-MCNC: 7.6 G/DL (ref 6–8.5)
PROT UR STRIP-MCNC: NEGATIVE MG/DL
RBC # BLD AUTO: 5.36 10*6/MM3 (ref 4.14–5.8)
RBC # UR STRIP: NEGATIVE /UL
SODIUM SERPL-SCNC: 144 MMOL/L (ref 136–145)
SP GR UR: 1.01 (ref 1–1.03)
UROBILINOGEN UR QL: NORMAL
WBC NRBC COR # BLD: 3.87 10*3/MM3 (ref 3.4–10.8)

## 2023-03-29 PROCEDURE — 80053 COMPREHEN METABOLIC PANEL: CPT | Performed by: NURSE PRACTITIONER

## 2023-03-29 PROCEDURE — 74177 CT ABD & PELVIS W/CONTRAST: CPT

## 2023-03-29 PROCEDURE — 36415 COLL VENOUS BLD VENIPUNCTURE: CPT | Performed by: NURSE PRACTITIONER

## 2023-03-29 PROCEDURE — 85025 COMPLETE CBC W/AUTO DIFF WBC: CPT | Performed by: NURSE PRACTITIONER

## 2023-03-29 PROCEDURE — 82565 ASSAY OF CREATININE: CPT

## 2023-03-29 PROCEDURE — 25510000001 IOPAMIDOL 61 % SOLUTION: Performed by: NURSE PRACTITIONER

## 2023-03-29 PROCEDURE — 83690 ASSAY OF LIPASE: CPT | Performed by: NURSE PRACTITIONER

## 2023-03-29 RX ADMIN — IOPAMIDOL 85 ML: 612 INJECTION, SOLUTION INTRAVENOUS at 18:29

## 2023-03-29 NOTE — PROGRESS NOTES
"Chief Complaint   Patient presents with   • Abdominal Pain     For the last 2 weeks.         HPI  Gerardo Mason is a 52 y.o. male presents with abdominal pain.  This started about 2 weeks ago.  Some nausea.  Having some gas.  He was having some constipation but he changed his diet and that has improved.  Voiding well.  He states the pain is constant and does not go away.  Last month he was seen for RUQ pain and had a normal U/S of his gallbladder.  He denies any heartburn.    The following portions of the patient's history were reviewed and updated as appropriate: allergies, current medications, past family history, past medical history, past social history, past surgical history and problem list.    Subjective  Review of Systems   Constitutional: Negative for fever and unexpected weight loss.   Gastrointestinal: Positive for abdominal pain, constipation and nausea. Negative for diarrhea, vomiting, GERD and indigestion.   Genitourinary: Negative for dysuria, frequency and hematuria.   Musculoskeletal: Negative for arthralgias and myalgias.       Objective  Visit Vitals  /72 (BP Location: Left arm, Patient Position: Sitting)   Pulse 68   Temp 98.5 °F (36.9 °C)   Ht 162.6 cm (64\")   Wt 68.9 kg (152 lb)   SpO2 99%   BMI 26.09 kg/m²        Physical Exam  Vitals and nursing note reviewed.   HENT:      Head: Normocephalic.   Eyes:      Pupils: Pupils are equal, round, and reactive to light.   Cardiovascular:      Rate and Rhythm: Normal rate and regular rhythm.      Pulses: Normal pulses.      Heart sounds: Normal heart sounds.   Pulmonary:      Effort: Pulmonary effort is normal.      Breath sounds: Normal breath sounds.   Abdominal:      General: Bowel sounds are normal. There is no distension.      Palpations: Abdomen is soft.      Tenderness: There is abdominal tenderness. There is no guarding or rebound.       Skin:     General: Skin is warm and dry.      Capillary Refill: Capillary refill takes less than 2 " seconds.   Neurological:      General: No focal deficit present.      Mental Status: He is alert and oriented to person, place, and time.      Gait: Gait is intact.   Psychiatric:         Attention and Perception: Attention normal.         Mood and Affect: Mood normal.         Behavior: Behavior normal.          Procedures         Results for orders placed or performed in visit on 03/29/23   POC Urinalysis Dipstick, Automated    Specimen: Urine   Result Value Ref Range    Color Yellow Yellow, Straw, Dark Yellow, Esha    Clarity, UA Clear Clear    Specific Gravity  1.015 1.005 - 1.030    pH, Urine 7.0 5.0 - 8.0    Leukocytes Negative Negative    Nitrite, UA Negative Negative    Protein, POC Negative Negative mg/dL    Glucose, UA Negative Negative mg/dL    Ketones, UA Negative Negative    Urobilinogen, UA Normal Normal, 0.2 E.U./dL    Bilirubin Negative Negative    Blood, UA Negative Negative    Lot Number [id number]     Expiration Date 02/08/2024          Assessment and Plan  Diagnoses and all orders for this visit:    1. Epigastric pain (Primary)  -     CBC w AUTO Differential; Future  -     Comprehensive Metabolic Panel  -     Lipase; Future  -     CT Abdomen Pelvis With Contrast; Future  -     POC Urinalysis Dipstick, Automated  -     CBC w AUTO Differential  -     Lipase    2. Nausea  -     CBC w AUTO Differential; Future  -     Comprehensive Metabolic Panel  -     Lipase; Future  -     CT Abdomen Pelvis With Contrast; Future  -     CBC w AUTO Differential  -     Lipase    3. Other constipation    Check labs  Schedule CT of abd due to constant pain x2 wks  Urine normal  Constipation resolved with diet change    Return if symptoms worsen or fail to improve.      Phil Hugo APRN

## 2023-03-30 DIAGNOSIS — K29.00 OTHER ACUTE GASTRITIS WITHOUT HEMORRHAGE: Primary | ICD-10-CM

## 2023-03-30 RX ORDER — PANTOPRAZOLE SODIUM 20 MG/1
20 TABLET, DELAYED RELEASE ORAL DAILY
Qty: 30 TABLET | Refills: 0 | Status: SHIPPED | OUTPATIENT
Start: 2023-03-30

## 2023-04-03 LAB — CREAT BLDA-MCNC: 1.4 MG/DL (ref 0.6–1.3)

## 2023-05-11 ENCOUNTER — OFFICE VISIT (OUTPATIENT)
Dept: INTERNAL MEDICINE | Facility: CLINIC | Age: 53
End: 2023-05-11
Payer: MEDICAID

## 2023-05-11 ENCOUNTER — LAB (OUTPATIENT)
Dept: LAB | Facility: HOSPITAL | Age: 53
End: 2023-05-11
Payer: MEDICAID

## 2023-05-11 VITALS
SYSTOLIC BLOOD PRESSURE: 128 MMHG | WEIGHT: 155.4 LBS | TEMPERATURE: 97.3 F | HEART RATE: 57 BPM | DIASTOLIC BLOOD PRESSURE: 96 MMHG | OXYGEN SATURATION: 98 % | BODY MASS INDEX: 26.67 KG/M2

## 2023-05-11 DIAGNOSIS — R39.89 ABNORMAL URINE COLOR: Primary | ICD-10-CM

## 2023-05-11 DIAGNOSIS — R39.89 ABNORMAL URINE COLOR: ICD-10-CM

## 2023-05-11 LAB
BILIRUB BLD-MCNC: NEGATIVE MG/DL
CLARITY, POC: CLEAR
COLOR UR: YELLOW
EXPIRATION DATE: NORMAL
GLUCOSE UR STRIP-MCNC: NEGATIVE MG/DL
KETONES UR QL: NEGATIVE
LEUKOCYTE EST, POC: NEGATIVE
Lab: NORMAL
NITRITE UR-MCNC: NEGATIVE MG/ML
PH UR: 7 [PH] (ref 5–8)
PROT UR STRIP-MCNC: NEGATIVE MG/DL
RBC # UR STRIP: NEGATIVE /UL
SP GR UR: 1.01 (ref 1–1.03)
UROBILINOGEN UR QL: NORMAL

## 2023-05-11 PROCEDURE — 87086 URINE CULTURE/COLONY COUNT: CPT | Performed by: PHYSICIAN ASSISTANT

## 2023-05-11 NOTE — PROGRESS NOTES
[facility]  [address]  [facility] [address]  Dept: [phone]  Dept Fax: [phone]  Loc: [phone]  Loc Fax: [phone]    Gerardo Mason  1970    Follow Up Office Visit Note    History of Present Illness:  Patient is a 52-year-old male in today for color change in his urine.  Patient states that it has gone from light yellow to dark yellow.  Drinking 4 bottles which is approximately 80 ounces of water daily.  Urination frequency the same.  Denies any urgency.  Denies any flank pain but does note low back pain that has been chronic but for these issues.  Patient using heating pad for this.  Reports pain radiating down into his legs.  Denies any pelvic pain.  Denies any dysuria.  Denies any fever or chills.      The following portions of the patient's history were reviewed and updated as appropriate: allergies, current medications, past family history, past medical history, past social history, past surgical history, and problem list.    Medications:    Current Outpatient Medications:   •  pantoprazole (Protonix) 20 MG EC tablet, Take 1 tablet by mouth Daily., Disp: 30 tablet, Rfl: 0  •  amLODIPine (NORVASC) 10 MG tablet, Take 1 tablet by mouth Daily. (Patient not taking: Reported on 5/11/2023), Disp: 90 tablet, Rfl: 2    Subjective  Allergies   Allergen Reactions   • Penicillins Unknown - Low Severity        Past Medical History:   Diagnosis Date   • Allergic August 2013   • Colon polyp    • GERD (gastroesophageal reflux disease)    • Heart murmur    • Hypertension 2013   • Peptic ulceration        Past Surgical History:   Procedure Laterality Date   • PTERYGIUM EXCISION         Family History   Problem Relation Age of Onset   • Alzheimer's disease Mother         Social History     Socioeconomic History   • Marital status:    Tobacco Use   • Smoking status: Never   • Smokeless tobacco: Never   Vaping Use   • Vaping Use: Never used    Substance and Sexual Activity   • Alcohol use: Never   • Drug use: Never   • Sexual activity: Yes     Partners: Male     Birth control/protection: None       Review of Systems   Constitutional: Negative for activity change, chills, fatigue, fever and unexpected weight change.   HENT: Negative for congestion, ear pain, postnasal drip, sinus pressure and sore throat.    Eyes: Negative for pain, discharge and redness.   Respiratory: Negative for cough, shortness of breath and wheezing.    Cardiovascular: Negative for chest pain, palpitations and leg swelling.   Gastrointestinal: Negative for diarrhea, nausea and vomiting.   Endocrine: Negative for cold intolerance and heat intolerance.   Genitourinary: Negative for decreased urine volume and dysuria.   Musculoskeletal: Negative for arthralgias and myalgias.   Skin: Negative for rash and wound.   Neurological: Negative for dizziness, light-headedness and headaches.   Hematological: Does not bruise/bleed easily.   Psychiatric/Behavioral: Negative for confusion, dysphoric mood and sleep disturbance. The patient is not nervous/anxious.          Objective  Vitals:    05/11/23 1317   BP: 128/96   BP Location: Left arm   Patient Position: Sitting   Cuff Size: Adult   Pulse: 57   Temp: 97.3 °F (36.3 °C)   TempSrc: Temporal   SpO2: 98%   Weight: 70.5 kg (155 lb 6.4 oz)     Body mass index is 26.67 kg/m².     Physical Exam  Physical Exam  Vitals and nursing note reviewed.   Constitutional:       General: He is not in acute distress.     Appearance: He is not ill-appearing.   HENT:      Head: Normocephalic.      Right Ear: Tympanic membrane, ear canal and external ear normal. There is no impacted cerumen.      Left Ear: Tympanic membrane, ear canal and external ear normal. There is no impacted cerumen.      Nose: No congestion or rhinorrhea.      Mouth/Throat:      Mouth: Mucous membranes are moist.      Pharynx: Oropharynx is clear. No oropharyngeal exudate or posterior  oropharyngeal erythema.   Eyes:      General:         Right eye: No discharge.         Left eye: No discharge.      Extraocular Movements: Extraocular movements intact.      Conjunctiva/sclera: Conjunctivae normal.      Pupils: Pupils are equal, round, and reactive to light.   Cardiovascular:      Rate and Rhythm: Normal rate and regular rhythm.      Heart sounds: Normal heart sounds. No murmur heard.    No friction rub. No gallop.   Pulmonary:      Effort: Pulmonary effort is normal. No respiratory distress.      Breath sounds: Normal breath sounds. No wheezing.   Abdominal:      General: Bowel sounds are normal. There is no distension.      Palpations: Abdomen is soft. There is no mass.      Tenderness: There is no abdominal tenderness.   Musculoskeletal:         General: No swelling. Normal range of motion.      Cervical back: Normal range of motion. No tenderness.      Right lower leg: No edema.      Left lower leg: No edema.   Lymphadenopathy:      Cervical: No cervical adenopathy.   Skin:     Findings: No bruising, erythema or rash.   Neurological:      Mental Status: He is oriented to person, place, and time.      Gait: Gait normal.   Psychiatric:         Mood and Affect: Mood normal.         Behavior: Behavior normal.         Thought Content: Thought content normal.         Judgment: Judgment normal.         Diagnostic Data  Procedures    Assessment  Diagnoses and all orders for this visit:    1. Abnormal urine color (Primary)  -     POCT urinalysis dipstick, automated        Plan    1. Abnormal urine color (Primary)- UA in office was negative, urine color was clear.  Patient left the office before results were given to him.  Obtain culture and sensitivity.      Return if symptoms worsen or fail to improve.    Tonio Nam PA-C  05/11/2023  Answers for HPI/ROS submitted by the patient on 5/11/2023  What is the primary reason for your visit?: Other  Please describe your symptoms.: change in uniral color .  Pain in lower back and waist  Have you had these symptoms before?: Yes  How long have you been having these symptoms?: Greater than 2 weeks  Please list any medications you are currently taking for this condition.: Amoldeppine 10 mg

## 2023-05-12 ENCOUNTER — TELEPHONE (OUTPATIENT)
Dept: INTERNAL MEDICINE | Facility: CLINIC | Age: 53
End: 2023-05-12
Payer: MEDICAID

## 2023-05-13 LAB — BACTERIA SPEC AEROBE CULT: NO GROWTH

## 2023-07-27 ENCOUNTER — LAB (OUTPATIENT)
Dept: INTERNAL MEDICINE | Facility: CLINIC | Age: 53
End: 2023-07-27
Payer: MEDICAID

## 2023-07-27 DIAGNOSIS — Z13.1 SCREENING FOR DIABETES MELLITUS: ICD-10-CM

## 2023-07-27 DIAGNOSIS — D70.9 NEUTROPENIA, UNSPECIFIED TYPE: ICD-10-CM

## 2023-07-27 LAB
BASOPHILS # BLD AUTO: 0.01 10*3/MM3 (ref 0–0.2)
BASOPHILS NFR BLD AUTO: 0.3 % (ref 0–1.5)
DEPRECATED RDW RBC AUTO: 43.3 FL (ref 37–54)
EOSINOPHIL # BLD AUTO: 0.04 10*3/MM3 (ref 0–0.4)
EOSINOPHIL NFR BLD AUTO: 1.2 % (ref 0.3–6.2)
ERYTHROCYTE [DISTWIDTH] IN BLOOD BY AUTOMATED COUNT: 13.4 % (ref 12.3–15.4)
HBA1C MFR BLD: 5.4 % (ref 4.8–5.6)
HCT VFR BLD AUTO: 40.7 % (ref 37.5–51)
HGB BLD-MCNC: 14.3 G/DL (ref 13–17.7)
LYMPHOCYTES # BLD AUTO: 1.81 10*3/MM3 (ref 0.7–3.1)
LYMPHOCYTES NFR BLD AUTO: 55.4 % (ref 19.6–45.3)
MCH RBC QN AUTO: 31.3 PG (ref 26.6–33)
MCHC RBC AUTO-ENTMCNC: 35.1 G/DL (ref 31.5–35.7)
MCV RBC AUTO: 89.1 FL (ref 79–97)
MONOCYTES # BLD AUTO: 0.31 10*3/MM3 (ref 0.1–0.9)
MONOCYTES NFR BLD AUTO: 9.5 % (ref 5–12)
NEUTROPHILS NFR BLD AUTO: 1.09 10*3/MM3 (ref 1.7–7)
NEUTROPHILS NFR BLD AUTO: 33.3 % (ref 42.7–76)
PLATELET # BLD AUTO: 167 10*3/MM3 (ref 140–450)
PMV BLD AUTO: 11.1 FL (ref 6–12)
RBC # BLD AUTO: 4.57 10*6/MM3 (ref 4.14–5.8)
WBC NRBC COR # BLD: 3.27 10*3/MM3 (ref 3.4–10.8)

## 2023-07-27 PROCEDURE — 83540 ASSAY OF IRON: CPT | Performed by: NURSE PRACTITIONER

## 2023-07-27 PROCEDURE — 36415 COLL VENOUS BLD VENIPUNCTURE: CPT | Performed by: NURSE PRACTITIONER

## 2023-07-27 PROCEDURE — 82746 ASSAY OF FOLIC ACID SERUM: CPT | Performed by: NURSE PRACTITIONER

## 2023-07-27 PROCEDURE — 82728 ASSAY OF FERRITIN: CPT | Performed by: NURSE PRACTITIONER

## 2023-07-27 PROCEDURE — 85007 BL SMEAR W/DIFF WBC COUNT: CPT | Performed by: NURSE PRACTITIONER

## 2023-07-27 PROCEDURE — 82607 VITAMIN B-12: CPT | Performed by: NURSE PRACTITIONER

## 2023-07-27 PROCEDURE — 83020 HEMOGLOBIN ELECTROPHORESIS: CPT | Performed by: NURSE PRACTITIONER

## 2023-07-27 PROCEDURE — 82306 VITAMIN D 25 HYDROXY: CPT | Performed by: NURSE PRACTITIONER

## 2023-07-27 PROCEDURE — 85025 COMPLETE CBC W/AUTO DIFF WBC: CPT | Performed by: NURSE PRACTITIONER

## 2023-07-27 PROCEDURE — 84466 ASSAY OF TRANSFERRIN: CPT | Performed by: NURSE PRACTITIONER

## 2023-07-27 PROCEDURE — 83036 HEMOGLOBIN GLYCOSYLATED A1C: CPT | Performed by: NURSE PRACTITIONER

## 2023-07-28 LAB
BASOPHILS # BLD MANUAL: 0.04 10*3/MM3 (ref 0–0.2)
BASOPHILS NFR BLD MANUAL: 1.1 % (ref 0–1.5)
DACRYOCYTES BLD QL SMEAR: ABNORMAL
EOSINOPHIL # BLD MANUAL: 0.04 10*3/MM3 (ref 0–0.4)
EOSINOPHIL NFR BLD MANUAL: 1.1 % (ref 0.3–6.2)
FOLATE SERPL-MCNC: 9.71 NG/ML (ref 4.78–24.2)
LYMPHOCYTES # BLD MANUAL: 1.79 10*3/MM3 (ref 0.7–3.1)
LYMPHOCYTES NFR BLD MANUAL: 6.3 % (ref 5–12)
MONOCYTES # BLD: 0.21 10*3/MM3 (ref 0.1–0.9)
NEUTROPHILS # BLD AUTO: 1.2 10*3/MM3 (ref 1.7–7)
NEUTROPHILS NFR BLD MANUAL: 36.8 % (ref 42.7–76)
PLAT MORPH BLD: NORMAL
POIKILOCYTOSIS BLD QL SMEAR: ABNORMAL
POLYCHROMASIA BLD QL SMEAR: ABNORMAL
SPHEROCYTES BLD QL SMEAR: ABNORMAL
VARIANT LYMPHS NFR BLD MANUAL: 54.7 % (ref 19.6–45.3)
VIT B12 BLD-MCNC: 441 PG/ML (ref 211–946)
WBC MORPH BLD: NORMAL

## 2023-08-02 ENCOUNTER — TELEPHONE (OUTPATIENT)
Dept: INTERNAL MEDICINE | Facility: CLINIC | Age: 53
End: 2023-08-02
Payer: MEDICAID

## 2023-08-28 DIAGNOSIS — I10 ESSENTIAL HYPERTENSION: ICD-10-CM

## 2023-08-28 RX ORDER — AMLODIPINE BESYLATE 10 MG/1
10 TABLET ORAL DAILY
Qty: 90 TABLET | Refills: 0 | Status: SHIPPED | OUTPATIENT
Start: 2023-08-28

## 2023-08-28 NOTE — TELEPHONE ENCOUNTER
Caller: Gerardo Mason    Relationship: Self    Best call back number: [phone]    Requested Prescriptions:   Requested Prescriptions     Pending Prescriptions Disp Refills    amLODIPine (NORVASC) 10 MG tablet 90 tablet 2     Sig: Take 1 tablet by mouth Daily.    A MALARIA MEDICATION THAT PREVENTS AGAINST MASKETO BITES WHEN TRAVELING    Pharmacy where request should be sent: [id number] - [place], [place] - [address] [facility] [phone] [facility] [phone] FX     Last office visit with prescribing clinician: 3/29/2023   Last telemedicine visit with prescribing clinician: Visit date not found   Next office visit with prescribing clinician: Visit date not found     Additional details provided by patient: THE PATIENT STATES THAT HE HAS MOVED OUT OF STATE AND HE IS OUT OF MEDICATION THE PATIENT ALSO STATES THAT HE IS GOING OUT OF THE COUNTRY ON WEDNESDAY AND HE WILL BE GONE FOR A MONTH    Does the patient have less than a 3 day supply:  [x] Yes  [] No    Would you like a call back once the refill request has been completed: [] Yes [x] No    If the office needs to give you a call back, can they leave a voicemail: [] Yes [x] No    Franky Harmon Rep   08/28/23 10:24 EDT

## 2023-10-07 DIAGNOSIS — I10 ESSENTIAL HYPERTENSION: ICD-10-CM

## 2023-10-09 RX ORDER — AMLODIPINE BESYLATE 10 MG/1
10 TABLET ORAL DAILY
Qty: 90 TABLET | Refills: 0 | Status: SHIPPED | OUTPATIENT
Start: 2023-10-09

## 2023-11-24 DIAGNOSIS — I10 ESSENTIAL HYPERTENSION: ICD-10-CM

## 2023-11-24 RX ORDER — AMLODIPINE BESYLATE 10 MG/1
10 TABLET ORAL DAILY
Qty: 90 TABLET | Refills: 0 | Status: SHIPPED | OUTPATIENT
Start: 2023-11-24